# Patient Record
Sex: FEMALE | Race: WHITE | Employment: FULL TIME | URBAN - METROPOLITAN AREA
[De-identification: names, ages, dates, MRNs, and addresses within clinical notes are randomized per-mention and may not be internally consistent; named-entity substitution may affect disease eponyms.]

---

## 2024-03-25 ENCOUNTER — HOSPITAL ENCOUNTER (OUTPATIENT)
Dept: ULTRASOUND IMAGING | Age: 39
Discharge: HOME | End: 2024-03-27
Payer: COMMERCIAL

## 2024-03-25 DIAGNOSIS — I10 ESSENTIAL (PRIMARY) HYPERTENSION: ICD-10-CM

## 2024-03-25 PROCEDURE — 76705 ECHO EXAM OF ABDOMEN: CPT

## 2024-05-24 RX ORDER — SODIUM CHLORIDE, SODIUM LACTATE, POTASSIUM CHLORIDE, CALCIUM CHLORIDE 600; 310; 30; 20 MG/100ML; MG/100ML; MG/100ML; MG/100ML
INJECTION, SOLUTION INTRAVENOUS CONTINUOUS
OUTPATIENT
Start: 2024-05-24

## 2024-05-29 ENCOUNTER — HOSPITAL ENCOUNTER (OUTPATIENT)
Dept: PREADMISSION TESTING | Age: 39
Discharge: HOME | End: 2024-05-29
Payer: COMMERCIAL

## 2024-05-29 VITALS
HEART RATE: 74 BPM | DIASTOLIC BLOOD PRESSURE: 76 MMHG | HEIGHT: 68 IN | OXYGEN SATURATION: 100 % | WEIGHT: 268 LBS | TEMPERATURE: 98.1 F | SYSTOLIC BLOOD PRESSURE: 123 MMHG | RESPIRATION RATE: 16 BRPM | BODY MASS INDEX: 40.62 KG/M2

## 2024-05-29 DIAGNOSIS — Z01.818 PREOP TESTING: Primary | ICD-10-CM

## 2024-05-29 DIAGNOSIS — Z01.818 PRE-OP TESTING: ICD-10-CM

## 2024-05-29 LAB
ABO + RH BLD: NORMAL
ALBUMIN SERPL-MCNC: 4.1 G/DL (ref 3.5–5.2)
ALP SERPL-CCNC: 61 U/L (ref 35–104)
ALT SERPL-CCNC: 10 U/L (ref 0–32)
ANION GAP SERPL CALCULATED.3IONS-SCNC: 9 MMOL/L (ref 7–16)
ARM BAND NUMBER: NORMAL
AST SERPL-CCNC: 14 U/L (ref 0–31)
BASOPHILS # BLD: 0.08 K/UL (ref 0–0.2)
BASOPHILS NFR BLD: 1 % (ref 0–2)
BILIRUB SERPL-MCNC: 0.3 MG/DL (ref 0–1.2)
BLOOD BANK SAMPLE EXPIRATION: NORMAL
BLOOD GROUP ANTIBODIES SERPL: NEGATIVE
BUN SERPL-MCNC: 14 MG/DL (ref 6–20)
CALCIUM SERPL-MCNC: 8.7 MG/DL (ref 8.6–10.2)
CHLORIDE SERPL-SCNC: 105 MMOL/L (ref 98–107)
CO2 SERPL-SCNC: 25 MMOL/L (ref 22–29)
CREAT SERPL-MCNC: 0.8 MG/DL (ref 0.5–1)
EOSINOPHIL # BLD: 0.28 K/UL (ref 0.05–0.5)
EOSINOPHILS RELATIVE PERCENT: 3 % (ref 0–6)
ERYTHROCYTE [DISTWIDTH] IN BLOOD BY AUTOMATED COUNT: 13.6 % (ref 11.5–15)
GFR, ESTIMATED: >90 ML/MIN/1.73M2
GLUCOSE SERPL-MCNC: 86 MG/DL (ref 74–99)
HCT VFR BLD AUTO: 38.3 % (ref 34–48)
HGB BLD-MCNC: 12.3 G/DL (ref 11.5–15.5)
IMM GRANULOCYTES # BLD AUTO: 0.04 K/UL (ref 0–0.58)
IMM GRANULOCYTES NFR BLD: 0 % (ref 0–5)
LYMPHOCYTES NFR BLD: 2.57 K/UL (ref 1.5–4)
LYMPHOCYTES RELATIVE PERCENT: 27 % (ref 20–42)
MCH RBC QN AUTO: 27.4 PG (ref 26–35)
MCHC RBC AUTO-ENTMCNC: 32.1 G/DL (ref 32–34.5)
MCV RBC AUTO: 85.3 FL (ref 80–99.9)
MONOCYTES NFR BLD: 0.6 K/UL (ref 0.1–0.95)
MONOCYTES NFR BLD: 6 % (ref 2–12)
NEUTROPHILS NFR BLD: 62 % (ref 43–80)
NEUTS SEG NFR BLD: 5.84 K/UL (ref 1.8–7.3)
PLATELET # BLD AUTO: 242 K/UL (ref 130–450)
PMV BLD AUTO: 11.6 FL (ref 7–12)
POTASSIUM SERPL-SCNC: 4 MMOL/L (ref 3.5–5)
PROT SERPL-MCNC: 7.2 G/DL (ref 6.4–8.3)
RBC # BLD AUTO: 4.49 M/UL (ref 3.5–5.5)
SODIUM SERPL-SCNC: 139 MMOL/L (ref 132–146)
WBC OTHER # BLD: 9.4 K/UL (ref 4.5–11.5)

## 2024-05-29 PROCEDURE — 86850 RBC ANTIBODY SCREEN: CPT

## 2024-05-29 PROCEDURE — 86901 BLOOD TYPING SEROLOGIC RH(D): CPT

## 2024-05-29 PROCEDURE — 86900 BLOOD TYPING SEROLOGIC ABO: CPT

## 2024-05-29 PROCEDURE — 85025 COMPLETE CBC W/AUTO DIFF WBC: CPT

## 2024-05-29 PROCEDURE — 93005 ELECTROCARDIOGRAM TRACING: CPT | Performed by: ANESTHESIOLOGY

## 2024-05-29 PROCEDURE — 80053 COMPREHEN METABOLIC PANEL: CPT

## 2024-05-29 RX ORDER — LISINOPRIL 20 MG/1
20 TABLET ORAL DAILY
COMMUNITY
Start: 2024-03-20

## 2024-05-29 NOTE — PROGRESS NOTES
Bank bracelet to the hospital on the day of surgery.    IIf appropriate bring crutches, inspirex, WALKER, CANE etc...    Notify your Surgeon if you develop any illness between now and surgery time, cough, cold, fever, sore throat, nausea, vomiting, etc.  Please notify your surgeon if you experience dizziness, shortness of breath or blurred vision between now & the time of your surgery.    If you have ___dentures, they will be removed before going to the OR; we will provide you a container. If you wear ___contact lenses or ___glasses, they will be removed; please bring a case for them.    To provide excellent care visitors will be limited to 1 in the room at any given time.                            During flu season no children under the age of 14 are permitted in the hospital for the safety of all patients.      On day of surgery please come to the main lobby and stop at the information desk.      Other                  Please call AMBULATORY CARE if you have any further questions.   Pre Admit Testing           [phone]     [facility] [phone]

## 2024-05-30 LAB
EKG ATRIAL RATE: 64 BPM
EKG P AXIS: 18 DEGREES
EKG P-R INTERVAL: 192 MS
EKG Q-T INTERVAL: 376 MS
EKG QRS DURATION: 80 MS
EKG QTC CALCULATION (BAZETT): 387 MS
EKG R AXIS: 7 DEGREES
EKG T AXIS: 30 DEGREES
EKG VENTRICULAR RATE: 64 BPM

## 2024-06-03 ENCOUNTER — ANESTHESIA EVENT (OUTPATIENT)
Dept: OPERATING ROOM | Age: 39
End: 2024-06-03
Payer: COMMERCIAL

## 2024-06-03 ENCOUNTER — HOSPITAL ENCOUNTER (OUTPATIENT)
Age: 39
Setting detail: OUTPATIENT SURGERY
Discharge: HOME | End: 2024-06-03
Attending: OBSTETRICS & GYNECOLOGY | Admitting: OBSTETRICS & GYNECOLOGY
Payer: COMMERCIAL

## 2024-06-03 ENCOUNTER — ANESTHESIA (OUTPATIENT)
Dept: OPERATING ROOM | Age: 39
End: 2024-06-03
Payer: COMMERCIAL

## 2024-06-03 VITALS
HEIGHT: 68 IN | WEIGHT: 268 LBS | HEART RATE: 56 BPM | DIASTOLIC BLOOD PRESSURE: 68 MMHG | BODY MASS INDEX: 40.62 KG/M2 | RESPIRATION RATE: 14 BRPM | TEMPERATURE: 98.2 F | SYSTOLIC BLOOD PRESSURE: 119 MMHG | OXYGEN SATURATION: 94 %

## 2024-06-03 DIAGNOSIS — N92.1 MENORRHAGIA WITH IRREGULAR CYCLE: ICD-10-CM

## 2024-06-03 DIAGNOSIS — G89.18 POSTOPERATIVE PAIN: Primary | ICD-10-CM

## 2024-06-03 PROBLEM — Z34.93 NORMAL PREGNANCY, THIRD TRIMESTER: Status: RESOLVED | Noted: 2020-11-24 | Resolved: 2024-06-03

## 2024-06-03 LAB
HCG, URINE, POC: NEGATIVE
Lab: NORMAL
NEGATIVE QC PASS/FAIL: NORMAL
POSITIVE QC PASS/FAIL: NORMAL

## 2024-06-03 PROCEDURE — 2580000003 HC RX 258: Performed by: OBSTETRICS & GYNECOLOGY

## 2024-06-03 PROCEDURE — 6360000002 HC RX W HCPCS: Performed by: NURSE ANESTHETIST, CERTIFIED REGISTERED

## 2024-06-03 PROCEDURE — S2900 ROBOTIC SURGICAL SYSTEM: HCPCS | Performed by: OBSTETRICS & GYNECOLOGY

## 2024-06-03 PROCEDURE — 2500000003 HC RX 250 WO HCPCS: Performed by: NURSE ANESTHETIST, CERTIFIED REGISTERED

## 2024-06-03 PROCEDURE — 88307 TISSUE EXAM BY PATHOLOGIST: CPT

## 2024-06-03 PROCEDURE — 2580000003 HC RX 258: Performed by: NURSE ANESTHETIST, CERTIFIED REGISTERED

## 2024-06-03 PROCEDURE — 7100000001 HC PACU RECOVERY - ADDTL 15 MIN: Performed by: OBSTETRICS & GYNECOLOGY

## 2024-06-03 PROCEDURE — 3600000019 HC SURGERY ROBOT ADDTL 15MIN: Performed by: OBSTETRICS & GYNECOLOGY

## 2024-06-03 PROCEDURE — 2580000003 HC RX 258: Performed by: ANESTHESIOLOGY

## 2024-06-03 PROCEDURE — 7100000010 HC PHASE II RECOVERY - FIRST 15 MIN: Performed by: OBSTETRICS & GYNECOLOGY

## 2024-06-03 PROCEDURE — 2709999900 HC NON-CHARGEABLE SUPPLY: Performed by: OBSTETRICS & GYNECOLOGY

## 2024-06-03 PROCEDURE — 6360000002 HC RX W HCPCS: Performed by: OBSTETRICS & GYNECOLOGY

## 2024-06-03 PROCEDURE — 7100000000 HC PACU RECOVERY - FIRST 15 MIN: Performed by: OBSTETRICS & GYNECOLOGY

## 2024-06-03 PROCEDURE — 3600000009 HC SURGERY ROBOT BASE: Performed by: OBSTETRICS & GYNECOLOGY

## 2024-06-03 PROCEDURE — 6360000002 HC RX W HCPCS: Performed by: ANESTHESIOLOGY

## 2024-06-03 PROCEDURE — 7100000011 HC PHASE II RECOVERY - ADDTL 15 MIN: Performed by: OBSTETRICS & GYNECOLOGY

## 2024-06-03 PROCEDURE — 3700000000 HC ANESTHESIA ATTENDED CARE: Performed by: OBSTETRICS & GYNECOLOGY

## 2024-06-03 PROCEDURE — 3700000001 HC ADD 15 MINUTES (ANESTHESIA): Performed by: OBSTETRICS & GYNECOLOGY

## 2024-06-03 RX ORDER — ONDANSETRON 2 MG/ML
4 INJECTION INTRAMUSCULAR; INTRAVENOUS
Status: DISCONTINUED | OUTPATIENT
Start: 2024-06-03 | End: 2024-06-03 | Stop reason: HOSPADM

## 2024-06-03 RX ORDER — MIDAZOLAM HYDROCHLORIDE 1 MG/ML
INJECTION INTRAMUSCULAR; INTRAVENOUS PRN
Status: DISCONTINUED | OUTPATIENT
Start: 2024-06-03 | End: 2024-06-03 | Stop reason: SDUPTHER

## 2024-06-03 RX ORDER — DEXMEDETOMIDINE HYDROCHLORIDE 100 UG/ML
INJECTION, SOLUTION INTRAVENOUS PRN
Status: DISCONTINUED | OUTPATIENT
Start: 2024-06-03 | End: 2024-06-03 | Stop reason: SDUPTHER

## 2024-06-03 RX ORDER — KETOROLAC TROMETHAMINE 30 MG/ML
INJECTION, SOLUTION INTRAMUSCULAR; INTRAVENOUS PRN
Status: DISCONTINUED | OUTPATIENT
Start: 2024-06-03 | End: 2024-06-03 | Stop reason: SDUPTHER

## 2024-06-03 RX ORDER — SODIUM CHLORIDE 0.9 % (FLUSH) 0.9 %
5-40 SYRINGE (ML) INJECTION PRN
Status: DISCONTINUED | OUTPATIENT
Start: 2024-06-03 | End: 2024-06-03 | Stop reason: HOSPADM

## 2024-06-03 RX ORDER — DIPHENHYDRAMINE HYDROCHLORIDE 50 MG/ML
INJECTION INTRAMUSCULAR; INTRAVENOUS PRN
Status: DISCONTINUED | OUTPATIENT
Start: 2024-06-03 | End: 2024-06-03 | Stop reason: SDUPTHER

## 2024-06-03 RX ORDER — SODIUM CHLORIDE, SODIUM LACTATE, POTASSIUM CHLORIDE, CALCIUM CHLORIDE 600; 310; 30; 20 MG/100ML; MG/100ML; MG/100ML; MG/100ML
INJECTION, SOLUTION INTRAVENOUS CONTINUOUS
Status: DISCONTINUED | OUTPATIENT
Start: 2024-06-03 | End: 2024-06-03 | Stop reason: HOSPADM

## 2024-06-03 RX ORDER — SODIUM CHLORIDE 0.9 % (FLUSH) 0.9 %
5-40 SYRINGE (ML) INJECTION EVERY 12 HOURS SCHEDULED
Status: DISCONTINUED | OUTPATIENT
Start: 2024-06-03 | End: 2024-06-03 | Stop reason: HOSPADM

## 2024-06-03 RX ORDER — ONDANSETRON HYDROCHLORIDE 8 MG/1
8 TABLET, FILM COATED ORAL EVERY 8 HOURS PRN
Qty: 25 TABLET | Refills: 1 | Status: SHIPPED | OUTPATIENT
Start: 2024-06-03

## 2024-06-03 RX ORDER — FENTANYL CITRATE 50 UG/ML
INJECTION, SOLUTION INTRAMUSCULAR; INTRAVENOUS PRN
Status: DISCONTINUED | OUTPATIENT
Start: 2024-06-03 | End: 2024-06-03 | Stop reason: SDUPTHER

## 2024-06-03 RX ORDER — LORAZEPAM 2 MG/ML
0.5 INJECTION INTRAMUSCULAR
Status: DISCONTINUED | OUTPATIENT
Start: 2024-06-03 | End: 2024-06-03 | Stop reason: HOSPADM

## 2024-06-03 RX ORDER — SODIUM CHLORIDE, SODIUM LACTATE, POTASSIUM CHLORIDE, CALCIUM CHLORIDE 600; 310; 30; 20 MG/100ML; MG/100ML; MG/100ML; MG/100ML
INJECTION, SOLUTION INTRAVENOUS CONTINUOUS PRN
Status: DISCONTINUED | OUTPATIENT
Start: 2024-06-03 | End: 2024-06-03 | Stop reason: SDUPTHER

## 2024-06-03 RX ORDER — KETOROLAC TROMETHAMINE 10 MG/1
10 TABLET, FILM COATED ORAL EVERY 6 HOURS PRN
Qty: 20 TABLET | Refills: 0 | Status: SHIPPED | OUTPATIENT
Start: 2024-06-03 | End: 2025-06-03

## 2024-06-03 RX ORDER — DEXAMETHASONE SODIUM PHOSPHATE 10 MG/ML
INJECTION, SOLUTION INTRAMUSCULAR; INTRAVENOUS PRN
Status: DISCONTINUED | OUTPATIENT
Start: 2024-06-03 | End: 2024-06-03 | Stop reason: SDUPTHER

## 2024-06-03 RX ORDER — ROCURONIUM BROMIDE 10 MG/ML
INJECTION, SOLUTION INTRAVENOUS PRN
Status: DISCONTINUED | OUTPATIENT
Start: 2024-06-03 | End: 2024-06-03 | Stop reason: SDUPTHER

## 2024-06-03 RX ORDER — SODIUM CHLORIDE 9 MG/ML
INJECTION, SOLUTION INTRAVENOUS PRN
Status: DISCONTINUED | OUTPATIENT
Start: 2024-06-03 | End: 2024-06-03 | Stop reason: HOSPADM

## 2024-06-03 RX ORDER — KETOROLAC TROMETHAMINE 30 MG/ML
30 INJECTION, SOLUTION INTRAMUSCULAR; INTRAVENOUS EVERY 6 HOURS PRN
Status: DISCONTINUED | OUTPATIENT
Start: 2024-06-03 | End: 2024-06-03 | Stop reason: HOSPADM

## 2024-06-03 RX ORDER — LIDOCAINE HYDROCHLORIDE 20 MG/ML
INJECTION, SOLUTION INTRAVENOUS PRN
Status: DISCONTINUED | OUTPATIENT
Start: 2024-06-03 | End: 2024-06-03 | Stop reason: SDUPTHER

## 2024-06-03 RX ORDER — PROPOFOL 10 MG/ML
INJECTION, EMULSION INTRAVENOUS PRN
Status: DISCONTINUED | OUTPATIENT
Start: 2024-06-03 | End: 2024-06-03 | Stop reason: SDUPTHER

## 2024-06-03 RX ORDER — METHOCARBAMOL 100 MG/ML
1000 INJECTION, SOLUTION INTRAMUSCULAR; INTRAVENOUS ONCE
Status: COMPLETED | OUTPATIENT
Start: 2024-06-03 | End: 2024-06-03

## 2024-06-03 RX ORDER — FENTANYL CITRATE 0.05 MG/ML
50 INJECTION, SOLUTION INTRAMUSCULAR; INTRAVENOUS EVERY 5 MIN PRN
Status: DISCONTINUED | OUTPATIENT
Start: 2024-06-03 | End: 2024-06-03 | Stop reason: HOSPADM

## 2024-06-03 RX ORDER — MEPERIDINE HYDROCHLORIDE 25 MG/ML
12.5 INJECTION INTRAMUSCULAR; INTRAVENOUS; SUBCUTANEOUS EVERY 5 MIN PRN
Status: DISCONTINUED | OUTPATIENT
Start: 2024-06-03 | End: 2024-06-03 | Stop reason: HOSPADM

## 2024-06-03 RX ORDER — ONDANSETRON 2 MG/ML
INJECTION INTRAMUSCULAR; INTRAVENOUS PRN
Status: DISCONTINUED | OUTPATIENT
Start: 2024-06-03 | End: 2024-06-03 | Stop reason: SDUPTHER

## 2024-06-03 RX ORDER — NALOXONE HYDROCHLORIDE 0.4 MG/ML
INJECTION, SOLUTION INTRAMUSCULAR; INTRAVENOUS; SUBCUTANEOUS PRN
Status: DISCONTINUED | OUTPATIENT
Start: 2024-06-03 | End: 2024-06-03 | Stop reason: HOSPADM

## 2024-06-03 RX ORDER — OXYCODONE HYDROCHLORIDE 5 MG/1
5 TABLET ORAL EVERY 6 HOURS PRN
Qty: 15 TABLET | Refills: 0 | Status: SHIPPED | OUTPATIENT
Start: 2024-06-03 | End: 2024-06-10

## 2024-06-03 RX ORDER — PROCHLORPERAZINE EDISYLATE 5 MG/ML
5 INJECTION INTRAMUSCULAR; INTRAVENOUS
Status: COMPLETED | OUTPATIENT
Start: 2024-06-03 | End: 2024-06-03

## 2024-06-03 RX ADMIN — FENTANYL CITRATE 50 MCG: 50 INJECTION, SOLUTION INTRAMUSCULAR; INTRAVENOUS at 09:05

## 2024-06-03 RX ADMIN — PROCHLORPERAZINE EDISYLATE 5 MG: 5 INJECTION INTRAMUSCULAR; INTRAVENOUS at 09:58

## 2024-06-03 RX ADMIN — SUGAMMADEX 250 MG: 100 INJECTION, SOLUTION INTRAVENOUS at 09:15

## 2024-06-03 RX ADMIN — MIDAZOLAM 2 MG: 1 INJECTION INTRAMUSCULAR; INTRAVENOUS at 07:43

## 2024-06-03 RX ADMIN — KETOROLAC TROMETHAMINE 30 MG: 30 INJECTION, SOLUTION INTRAMUSCULAR; INTRAVENOUS at 09:03

## 2024-06-03 RX ADMIN — LIDOCAINE HYDROCHLORIDE 100 MG: 20 INJECTION, SOLUTION INTRAVENOUS at 07:49

## 2024-06-03 RX ADMIN — PROPOFOL 200 MG: 10 INJECTION, EMULSION INTRAVENOUS at 07:49

## 2024-06-03 RX ADMIN — FENTANYL CITRATE 150 MCG: 50 INJECTION, SOLUTION INTRAMUSCULAR; INTRAVENOUS at 07:49

## 2024-06-03 RX ADMIN — SODIUM CHLORIDE, POTASSIUM CHLORIDE, SODIUM LACTATE AND CALCIUM CHLORIDE: 600; 310; 30; 20 INJECTION, SOLUTION INTRAVENOUS at 08:40

## 2024-06-03 RX ADMIN — SODIUM CHLORIDE, POTASSIUM CHLORIDE, SODIUM LACTATE AND CALCIUM CHLORIDE: 600; 310; 30; 20 INJECTION, SOLUTION INTRAVENOUS at 07:38

## 2024-06-03 RX ADMIN — ONDANSETRON 4 MG: 2 INJECTION INTRAMUSCULAR; INTRAVENOUS at 09:03

## 2024-06-03 RX ADMIN — DIPHENHYDRAMINE HYDROCHLORIDE 12.5 MG: 50 INJECTION INTRAMUSCULAR; INTRAVENOUS at 07:49

## 2024-06-03 RX ADMIN — SODIUM CHLORIDE, POTASSIUM CHLORIDE, SODIUM LACTATE AND CALCIUM CHLORIDE: 600; 310; 30; 20 INJECTION, SOLUTION INTRAVENOUS at 06:18

## 2024-06-03 RX ADMIN — FENTANYL CITRATE 50 MCG: 50 INJECTION, SOLUTION INTRAMUSCULAR; INTRAVENOUS at 08:46

## 2024-06-03 RX ADMIN — DEXMEDETOMIDINE HYDROCHLORIDE 16 MCG: 100 INJECTION, SOLUTION INTRAVENOUS at 09:10

## 2024-06-03 RX ADMIN — CEFOXITIN 2000 MG: 2 INJECTION, POWDER, FOR SOLUTION INTRAVENOUS at 07:54

## 2024-06-03 RX ADMIN — ROCURONIUM BROMIDE 50 MG: 10 SOLUTION INTRAVENOUS at 07:49

## 2024-06-03 RX ADMIN — METHOCARBAMOL 1000 MG: 100 INJECTION INTRAMUSCULAR; INTRAVENOUS at 10:00

## 2024-06-03 RX ADMIN — DEXAMETHASONE SODIUM PHOSPHATE 10 MG: 10 INJECTION, SOLUTION INTRAMUSCULAR; INTRAVENOUS at 07:49

## 2024-06-03 ASSESSMENT — PAIN - FUNCTIONAL ASSESSMENT
PAIN_FUNCTIONAL_ASSESSMENT: ACTIVITIES ARE NOT PREVENTED
PAIN_FUNCTIONAL_ASSESSMENT: ACTIVITIES ARE NOT PREVENTED
PAIN_FUNCTIONAL_ASSESSMENT: 0-10

## 2024-06-03 ASSESSMENT — PAIN SCALES - GENERAL
PAINLEVEL_OUTOF10: 1
PAINLEVEL_OUTOF10: 3
PAINLEVEL_OUTOF10: 0
PAINLEVEL_OUTOF10: 2

## 2024-06-03 ASSESSMENT — PAIN DESCRIPTION - DESCRIPTORS
DESCRIPTORS: ACHING;DISCOMFORT;SORE
DESCRIPTORS: ACHING;SORE;DISCOMFORT

## 2024-06-03 ASSESSMENT — PAIN DESCRIPTION - LOCATION
LOCATION: ABDOMEN
LOCATION: ABDOMEN

## 2024-06-03 ASSESSMENT — PAIN SCALES - WONG BAKER: WONGBAKER_NUMERICALRESPONSE: NO HURT

## 2024-06-03 ASSESSMENT — PAIN DESCRIPTION - ORIENTATION
ORIENTATION: MID;LOWER
ORIENTATION: MID

## 2024-06-03 NOTE — PROGRESS NOTES
CLINICAL PHARMACY NOTE: MEDS TO BEDS    Total # of Prescriptions Filled: 3   The following medications were delivered to the patient:  Oxycodone 5 mg  Ketorolac 10 mg  Ondansetron 8 mg    Additional Documentation:

## 2024-06-03 NOTE — ANESTHESIA PRE PROCEDURE
Department of Anesthesiology  Preprocedure Note       Name:  Dasia Prasad   Age:  38 y.o.  :  1985                                          MRN:  [id number]         Date:  6/3/2024      Surgeon: Surgeon(s):  Horacio Lindsey MD    Procedure: Procedure(s):  ROBOTIC ASSISTED TOTAL LAPAROSCOPIC HYSTERECTOMY    Medications prior to admission:   Prior to Admission medications    Medication Sig Start Date End Date Taking? Authorizing Provider   lisinopril (PRINIVIL;ZESTRIL) 20 MG tablet Take 1 tablet by mouth daily 3/20/24   Provider, MD Clemente       Current medications:    Current Facility-Administered Medications   Medication Dose Route Frequency Provider Last Rate Last Admin    lactated ringers IV soln infusion   IntraVENous Continuous Horacio Lindsey MD        sodium chloride flush 0.9 % injection 5-40 mL  5-40 mL IntraVENous 2 times per day Hoarcio Lindsey MD        sodium chloride flush 0.9 % injection 5-40 mL  5-40 mL IntraVENous PRN Horacio Lindsey MD        0.9 % sodium chloride infusion   IntraVENous PRN Horacio Lindsey MD        cefOXitin (MEFOXIN) 2,000 mg in sterile water 20 mL IV syringe  2,000 mg IntraVENous On Call to OR Horacio Lindsey MD        lactated ringers IV soln infusion   IntraVENous Continuous Neidig, Jerald Forbes MD 50 mL/hr at 24 0618 New Bag at 24 0618       Allergies:  No Known Allergies    Problem List:    Patient Active Problem List   Diagnosis Code    Obesity E66.9    Migraine headache G43.909    Fatigue R53.83    Normal pregnancy, third trimester Z34.93       Past Medical History:        Diagnosis Date    Hypertension     Migraine        Past Surgical History:        Procedure Laterality Date    APPENDECTOMY       SECTION       SECTION N/A 2020    REPEAT  SECTION performed by Osvaldo Reed MD at [facility] L&D OR    TUBAL LIGATION         Social History:    Social History     Tobacco Use    Smoking

## 2024-06-03 NOTE — DISCHARGE INSTRUCTIONS
Dr. Horacio Diop M.D.  [address], [place] [address]  [phone]    Robotic Hysterectomy Discharge Instructions    Activity  Rest when you feel tired. Getting enough sleep will help you recover.  Try to walk each day. Start by walking a little more than you did the day before. Bit by bit, increase the amount you walk. Walking boost blood flow and helps prevent pneumonia and constipation.  Avoid lifting anything that would make you strain. This may include heavy grocery bags and milk containers, a heavy briefcase or backpack, bags of cat litter or dog food, a vacuum , or a child.  Avoid strenuous activities, such as biking, jogging, weight lifting, or aerobic exercise, until your six (6) week check up.  You can drive when you are not taking narcotics for pain.  You may shower anytime after surgery. Pat the incision dry. Do not take a bath for the first two (2) weeks.   No sex for 6 weeks.    Diet  You can eat your normal diet. If your stomach is upset, try bland, low-fat foods like plain rice, broiled chicken, toast, and yogurt.  If your bowel movements are not regular right after surgery, try to avoid constipation and straining. Drink plenty of water. Your doctor may suggest fiber, a stool softener, or a mild laxative.    Medicines  Your doctor will tell you if and when you can restart your medicines. You will also get instructions about taking any new medicines.  If you take aspirin or some other blood thinner, ask your doctor if and when to start taking it again. Make sure that you understand exactly what your doctor wants you to do.  Be safe with your medicines. Read and follow all instructions on the label.             If you are given a prescription medicine for pain, take it as prescribed.             If you are not taking a prescription pain medicine, ask your doctor if you can take   an over-the -counter medicine.             If you are prescribed antibiotics, take them as directed. Do  not stop taking them just because your feel better. You need to take the full course of antibiotics.    Incision Care  You have stitches under the skin and are covered with surgical glue.  Wash the area daily with warm, soapy water, and pat it dry. Don't use hydrogen peroxide or alcohol. They can slow healing.  You may cover the area with a gauze bandage if it oozes fluid or rubs against clothing.    Other instructions  You may have some light vaginal bleeding. Wear sanitary pads if needed. Do not douche or use tampons.    Follow-up care is a key part of your treatment and safety. Be sure to make and go to all appointments, and call your doctor if you are having problems. It's also good idea to know your test results and keep a list of the medicines you take.    When should you call for help?  Call 911 anytime you think you may need emergency care. For example, call if:  You passed out (lost consciousness).  You have chest pain,are short of breath, cough up blood.  Call your doctor now or seek immediate medical care if:  You have pain that does not get better after you take pain medicine.  You cannot pass stools or gas.  You have vaginal discharge that has increased in amount or smells bad.  You are sick to your stomach or cannot drink fluids.  You have loose stitches, or your incision comes open.  Bright red blood has soaked through the bandage over your incision.  You have signs of infection such as :             Increased pain,swelling,warmth, or redness.             Red streaks leading from the incision.             Pus draining from the incision.             A fever.  You have severe vaginal bleeding. This means that you are soaking through your usual pads every hour for two (2) or more hours.  You have signs of a blood clot in your leg (called a deep vein thrombosis), such as:  Pain in your calf, back of the knee ,thigh, or groin.  Redness and swelling in your leg or groin.  Watch closely for changes in your

## 2024-06-03 NOTE — OP NOTE
Operative Note      Patient: Dasia Prasad  YOB: 1985  MRN: [id number]    Date of Procedure: 6/3/2024    Pre-Op Diagnosis Codes:     * Menorrhagia with irregular cycle [N92.1]    Post-Op Diagnosis: Same       Procedure(s):  ROBOTIC ASSISTED TOTAL LAPAROSCOPIC HYSTERECTOMY    Surgeon(s):  Horacio Lindsey MD    Assistant:   Surgical Assistant: Nakul Andre RN    Anesthesia: General    Estimated Blood Loss (mL): Minimal    Complications: None    Specimens:   ID Type Source Tests Collected by Time Destination   A : Uterus and cervix Tissue Tissue SURGICAL PATHOLOGY Horacio Lindsey MD 6/3/2024 0854        Implants:  * No implants in log *      Drains:   Urinary Catheter 06/03/24 Thomas (Active)   $ Urethral catheter insertion Inserted for procedure 06/03/24 0958   Catheter Indications Perioperative use for selected surgical procedures 06/03/24 0958   Site Assessment Pink;Moist 06/03/24 0958   Urine Color Yellow 06/03/24 0958   Urine Appearance Clear 06/03/24 0958   Collection Container Standard 06/03/24 0928   Catheter Best Practices  Drainage tube clipped to bed;Bag below bladder;Bag not on floor;Lack of dependent loop in tubing;Drainage bag less than half full 06/03/24 0958   Status Draining 06/03/24 0958   Output (mL) 250 mL 06/03/24 0928       Findings:  Infection Present At Time Of Surgery (PATOS) (choose all levels that have infection present):  No infection present  Other Findings: Uterus with superficial endometriosis    Detailed Description of Procedure:     Patient was brought to the operating room placed in dorsal supine position.  General anesthesia with endotracheal ovation was then performed.  Patient was then placed in the low lithotomy position with both arms tucked.  She was prepped and draped in usual sterile fashion.  Thomas drain was then placed.  Vaginal retractors were introduced cervix held single-tooth tenaculum and sounded to 10 cm.  A Paulina uterine manipulator was then  placed with 10 cm tip and a 3.5 cm cup.    Attention was then brought to the umbilicus at the base of the umbilicus skin incision made with a scalpel and a Veress needle was introduced.  Intra-abdominal flexion was proceeded with when deemed adequate a robotic trocar was then placed.  2 accessory trocars were then placed.  There were placed laterally at the level of the umbilicus.  The da Gregorio X Xi patient cart was then prepared and docked from the patient's left side and targeting was then performed.  The arms were then docked and the #1 arm was not utilized to was utilized with a force bipolar the #3 had the camera and the forehead the hot kena.    The right round ligament then identified coapt and divided with the force bipolar then opened up with the hot kena and opened up to the pelvic sidewall.  In the posterior leaf of the broad ligament avascular space the window was then created and then the right utero-ovarian ligament was then coapted and divided.  Bladder flap was then created and carried around to the other side the left front leg was then coapted and divided with the force bipolar than the hot sure the posterior of the broad leg was then opened in the left utero-ovarian ligament was then coapted and divided.  Bladder was then mobilized off the cervix and vagina using the hot kena and was well mobilized.  Uterine artery was then skeletonized on the right side and then coapt and divided with the force bipolar than the hot sure an identical procedure performed on the opposite side.  Cervix was then removed from the vagina by cutting along the KOH cup and the specimen was then retrieved.    Vaginal cuff was then closed with running suture of 0 V-Loc excellent hemostasis was noted the needle was then removed and a small amount of blood was then removed with the suction tip.  Patient was then had the instruments were then removed.  The da Gregorio X Xi cart was then undocked patient was then placed in

## 2024-06-03 NOTE — ANESTHESIA POSTPROCEDURE EVALUATION
Department of Anesthesiology  Postprocedure Note    Patient: Dasia Prasad  MRN: [id number]  YOB: 1985  Date of evaluation: 6/3/2024    Procedure Summary       Date: 06/03/24 Room / Location: [address]    Anesthesia Start: 0738 Anesthesia Stop: 0927    Procedure: ROBOTIC ASSISTED TOTAL LAPAROSCOPIC HYSTERECTOMY (Abdomen) Diagnosis:       Menorrhagia with irregular cycle      (Menorrhagia with irregular cycle [N92.1])    Surgeons: Horacio Lindsey MD Responsible Provider: Jerald Brar MD    Anesthesia Type: general ASA Status: 2            Anesthesia Type: No value filed.    Wen Phase I: Wen Score: 8    Wen Phase II:      Anesthesia Post Evaluation    Patient location during evaluation: PACU  Patient participation: complete - patient participated  Level of consciousness: awake and alert  Pain score: 3  Airway patency: patent  Nausea & Vomiting: no nausea  Cardiovascular status: blood pressure returned to baseline  Respiratory status: acceptable  Hydration status: euvolemic  Pain management: adequate    No notable events documented.

## 2024-06-12 LAB — SURGICAL PATHOLOGY REPORT: NORMAL

## 2024-10-31 ENCOUNTER — TELEPHONE (OUTPATIENT)
Dept: SLEEP CENTER | Age: 39
End: 2024-10-31

## 2024-10-31 DIAGNOSIS — M25.561 RIGHT KNEE PAIN, UNSPECIFIED CHRONICITY: Primary | ICD-10-CM

## 2024-11-05 ENCOUNTER — OFFICE VISIT (OUTPATIENT)
Dept: ORTHOPEDIC SURGERY | Age: 39
End: 2024-11-05
Payer: COMMERCIAL

## 2024-11-05 VITALS — BODY MASS INDEX: 39.4 KG/M2 | HEIGHT: 68 IN | WEIGHT: 260 LBS

## 2024-11-05 DIAGNOSIS — M17.11 PRIMARY OSTEOARTHRITIS OF RIGHT KNEE: Primary | ICD-10-CM

## 2024-11-05 PROCEDURE — G8419 CALC BMI OUT NRM PARAM NOF/U: HCPCS | Performed by: ORTHOPAEDIC SURGERY

## 2024-11-05 PROCEDURE — G8484 FLU IMMUNIZE NO ADMIN: HCPCS | Performed by: ORTHOPAEDIC SURGERY

## 2024-11-05 PROCEDURE — 99214 OFFICE O/P EST MOD 30 MIN: CPT | Performed by: ORTHOPAEDIC SURGERY

## 2024-11-05 PROCEDURE — G8427 DOCREV CUR MEDS BY ELIG CLIN: HCPCS | Performed by: ORTHOPAEDIC SURGERY

## 2024-11-05 PROCEDURE — 4004F PT TOBACCO SCREEN RCVD TLK: CPT | Performed by: ORTHOPAEDIC SURGERY

## 2024-11-05 NOTE — PROGRESS NOTES
Anterior Drawer negative, Posterior Drawer negative  Hipolito's negative, Thallasy  negative,   PF grind test negative, Apprehension test negative,  Patellar J sign  negative    Xrays:   Mild medial compartment djd right knee    MRI:   1.  Diffuse thickening of the ACL with mucinous degeneration, similar   thickening and signal within the proximal PCL and fibular collateral   ligaments.  Findings are suspicious for sequela of previous ligament   sprains.     2. No meniscal tear or residual bone contusion.     3.  Low-grade chondral degenerative changes with small joint effusion.   Radiographic findings reviewed with patient    Impression:  Encounter Diagnosis   Name Primary?    Primary osteoarthritis of right knee Yes       Plan:   Natural history and expected course discussed. Questions answered.  Educational materials distributed.  Rest, ice, compression, and elevation (RICE) therapy.  Reduction in offending activity.  Patellar compression sleeve.  OTC analgesics as needed.    I had a lengthy discussion with the patient regarding their diagnosis. I explained treatment options including surgical vs non surgical treatment. I reviewed in detail the risks and benefits and outlined the procedure in detail with expected outcomes and possible complications.  I also discussed non surgical treatment such as injections (CSI and visco supplementation), physical therapy, topical creams and NSAID's. They have elected for surgical management at this time.     The risks and benefits of a knee arthroscopy were discussed with the patient.  The risks are including but not limited to: infection, injuries to blood vessels and nerves, non relief of symptoms, arthrofibrosis of knee, need for further operative intervention, blood loss, PE/DVT, MI and death.  The risks are understood by the patient and they wish to proceed with a Right knee arthroscopy, chrondroplasty and debridement 12/20/24.

## 2024-11-07 DIAGNOSIS — G47.33 OBSTRUCTIVE SLEEP APNEA: Primary | ICD-10-CM

## 2024-11-21 ENCOUNTER — TELEPHONE (OUTPATIENT)
Dept: ORTHOPEDIC SURGERY | Age: 39
End: 2024-11-21

## 2024-11-21 ENCOUNTER — PREP FOR PROCEDURE (OUTPATIENT)
Dept: ORTHOPEDIC SURGERY | Age: 39
End: 2024-11-21

## 2024-11-21 PROBLEM — M17.11 RIGHT KNEE DJD: Status: ACTIVE | Noted: 2024-11-21

## 2024-11-21 NOTE — TELEPHONE ENCOUNTER
Prior Authorization Form:      DEMOGRAPHICS:                     Patient Name:  Annmarie Prasad  Patient :  1985            Insurance:  Payor: [facility] / Plan: [facility] MEDICAID / Product Type: *No Product type* /   Insurance ID Number:    Payer/Plan Subscr  Sex Relation Sub. Ins. ID Effective Group Num   1. YADIRA - * ANNMARIE PRASAD 1985 Female Self [id number]                                   [address]   2. YADIRA - * ANNMARIE PRASAD 1985 Female Self [id number]                                   [address]         DIAGNOSIS & PROCEDURE:                       Procedure/Operation: RIGHT KNEE ARTHROSCOPY WITH CHONDROPLASTY AND DEBRIDEMENT           CPT Code: 91564    Diagnosis:  RIGHT KNEE DJD    ICD10 Code: M17.11    Location:  [facility]    Surgeon:  YAMILA ALFONSO    SCHEDULING INFORMATION:                          Date: 24    Time: TO FOLLOW              Anesthesia:  General                                                       Status:  Outpatient        Special Comments:  NONE       Electronically signed by Lana Hernandez ATC on 2024 at 10:50 AM

## 2024-12-16 RX ORDER — ISOTRETINOIN 30 MG/1
30 CAPSULE ORAL 2 TIMES DAILY
COMMUNITY

## 2024-12-16 RX ORDER — LISINOPRIL AND HYDROCHLOROTHIAZIDE 12.5; 2 MG/1; MG/1
1 TABLET ORAL 2 TIMES DAILY
COMMUNITY
Start: 2024-09-28

## 2024-12-17 ENCOUNTER — HOSPITAL ENCOUNTER (OUTPATIENT)
Age: 39
Discharge: HOME | End: 2024-12-17
Payer: COMMERCIAL

## 2024-12-17 ENCOUNTER — ANESTHESIA EVENT (OUTPATIENT)
Dept: OPERATING ROOM | Age: 39
End: 2024-12-17
Payer: COMMERCIAL

## 2024-12-17 ENCOUNTER — TELEPHONE (OUTPATIENT)
Dept: SURGERY | Age: 39
End: 2024-12-17

## 2024-12-17 DIAGNOSIS — M17.11 OSTEOARTHRITIS OF RIGHT KNEE, UNSPECIFIED OSTEOARTHRITIS TYPE: ICD-10-CM

## 2024-12-17 LAB
ANION GAP SERPL CALCULATED.3IONS-SCNC: 11 MMOL/L (ref 7–16)
BUN SERPL-MCNC: 14 MG/DL (ref 6–20)
CALCIUM SERPL-MCNC: 8.7 MG/DL (ref 8.6–10.2)
CHLORIDE SERPL-SCNC: 101 MMOL/L (ref 98–107)
CO2 SERPL-SCNC: 24 MMOL/L (ref 22–29)
CREAT SERPL-MCNC: 0.8 MG/DL (ref 0.5–1)
EKG ATRIAL RATE: 84 BPM
EKG P AXIS: 24 DEGREES
EKG P-R INTERVAL: 168 MS
EKG Q-T INTERVAL: 352 MS
EKG QRS DURATION: 80 MS
EKG QTC CALCULATION (BAZETT): 415 MS
EKG R AXIS: 12 DEGREES
EKG T AXIS: 36 DEGREES
EKG VENTRICULAR RATE: 84 BPM
GFR, ESTIMATED: >90 ML/MIN/1.73M2
GLUCOSE SERPL-MCNC: 124 MG/DL (ref 74–99)
POTASSIUM SERPL-SCNC: 4.2 MMOL/L (ref 3.5–5)
SODIUM SERPL-SCNC: 136 MMOL/L (ref 132–146)

## 2024-12-17 PROCEDURE — 36415 COLL VENOUS BLD VENIPUNCTURE: CPT

## 2024-12-17 PROCEDURE — 93005 ELECTROCARDIOGRAM TRACING: CPT | Performed by: ANESTHESIOLOGY

## 2024-12-17 PROCEDURE — 80048 BASIC METABOLIC PNL TOTAL CA: CPT

## 2024-12-17 NOTE — TELEPHONE ENCOUNTER
Request for records sent to referring office/GI. Office staff states that patient had no procedures (EGD/Colonoscopy) done.   Electronically signed by Joann Porras RN on 12/17/2024 at 8:30 AM

## 2024-12-17 NOTE — ANESTHESIA PRE PROCEDURE
Department of Anesthesiology  Preprocedure Note       Name:  Dasia Prasad   Age:  39 y.o.  :  1985                                          MRN:  [id number]         Date:  2024      Surgeon: Surgeon(s):  Andrei Renteria DO    Procedure: Procedure(s):  RIGHT KNEE ARTHROSCOPY WITH CHONDROPLASTY AND DEBRIDEMENT-24    Medications prior to admission:   Prior to Admission medications    Medication Sig Start Date End Date Taking? Authorizing Provider   lisinopril-hydroCHLOROthiazide (PRINZIDE;ZESTORETIC) 20-12.5 MG per tablet Take 1 tablet by mouth 2 times daily 24  Yes Provider, MD Clemente   ISOtretinoin (ACCUTANE) 30 MG chemo capsule Take 1 capsule by mouth 2 times daily   Yes Provider, MD Clemente       Current medications:    Current Facility-Administered Medications   Medication Dose Route Frequency Provider Last Rate Last Admin    ceFAZolin (ANCEF) 2,000 mg in sterile water 20 mL IV syringe  2,000 mg IntraVENous Once Marianne Velazquez PA-C        lactated ringers infusion   IntraVENous Continuous TecVicki echavarria  mL/hr at 24 1048 New Bag at 24 1048       Allergies:  No Known Allergies    Problem List:    Patient Active Problem List   Diagnosis Code    Obesity E66.9    Migraine headache G43.909    Fatigue R53.83    Right knee DJD M17.11       Past Medical History:        Diagnosis Date    Hypertension     Migraine        Past Surgical History:        Procedure Laterality Date    APPENDECTOMY       SECTION       SECTION N/A 2020    REPEAT  SECTION performed by Osvaldo Reed MD at [facility] L&D OR    HYSTERECTOMY (CERVIX STATUS UNKNOWN) N/A 6/3/2024    ROBOTIC ASSISTED TOTAL LAPAROSCOPIC HYSTERECTOMY performed by Horacio Lindsey MD at [facility] OR    TUBAL LIGATION         Social History:    Social History     Tobacco Use    Smoking status: Every Day     Current packs/day: 0.75     Average packs/day: 0.8 packs/day for 21.0 years

## 2024-12-20 ENCOUNTER — HOSPITAL ENCOUNTER (OUTPATIENT)
Age: 39
Setting detail: OUTPATIENT SURGERY
Discharge: HOME | End: 2024-12-20
Attending: ORTHOPAEDIC SURGERY | Admitting: ORTHOPAEDIC SURGERY
Payer: COMMERCIAL

## 2024-12-20 ENCOUNTER — ANESTHESIA (OUTPATIENT)
Dept: OPERATING ROOM | Age: 39
End: 2024-12-20
Payer: COMMERCIAL

## 2024-12-20 VITALS
OXYGEN SATURATION: 95 % | HEIGHT: 68 IN | DIASTOLIC BLOOD PRESSURE: 87 MMHG | BODY MASS INDEX: 41.68 KG/M2 | RESPIRATION RATE: 16 BRPM | TEMPERATURE: 98 F | HEART RATE: 99 BPM | SYSTOLIC BLOOD PRESSURE: 140 MMHG | WEIGHT: 275 LBS

## 2024-12-20 DIAGNOSIS — M17.11 PRIMARY OSTEOARTHRITIS OF RIGHT KNEE: ICD-10-CM

## 2024-12-20 DIAGNOSIS — M17.11 OSTEOARTHRITIS OF RIGHT KNEE, UNSPECIFIED OSTEOARTHRITIS TYPE: Primary | ICD-10-CM

## 2024-12-20 PROCEDURE — 2709999900 HC NON-CHARGEABLE SUPPLY: Performed by: ORTHOPAEDIC SURGERY

## 2024-12-20 PROCEDURE — 7100000001 HC PACU RECOVERY - ADDTL 15 MIN: Performed by: ORTHOPAEDIC SURGERY

## 2024-12-20 PROCEDURE — 2500000003 HC RX 250 WO HCPCS

## 2024-12-20 PROCEDURE — 6360000002 HC RX W HCPCS: Performed by: NURSE ANESTHETIST, CERTIFIED REGISTERED

## 2024-12-20 PROCEDURE — 3700000001 HC ADD 15 MINUTES (ANESTHESIA): Performed by: ORTHOPAEDIC SURGERY

## 2024-12-20 PROCEDURE — 2720000010 HC SURG SUPPLY STERILE: Performed by: ORTHOPAEDIC SURGERY

## 2024-12-20 PROCEDURE — 3600000003 HC SURGERY LEVEL 3 BASE: Performed by: ORTHOPAEDIC SURGERY

## 2024-12-20 PROCEDURE — 3600000013 HC SURGERY LEVEL 3 ADDTL 15MIN: Performed by: ORTHOPAEDIC SURGERY

## 2024-12-20 PROCEDURE — 6360000002 HC RX W HCPCS: Performed by: ORTHOPAEDIC SURGERY

## 2024-12-20 PROCEDURE — 29877 ARTHRS KNEE SURG DBRDMT/SHVG: CPT | Performed by: ORTHOPAEDIC SURGERY

## 2024-12-20 PROCEDURE — 6360000002 HC RX W HCPCS

## 2024-12-20 PROCEDURE — 3700000000 HC ANESTHESIA ATTENDED CARE: Performed by: ORTHOPAEDIC SURGERY

## 2024-12-20 PROCEDURE — 2580000003 HC RX 258: Performed by: ANESTHESIOLOGY

## 2024-12-20 PROCEDURE — 7100000000 HC PACU RECOVERY - FIRST 15 MIN: Performed by: ORTHOPAEDIC SURGERY

## 2024-12-20 PROCEDURE — 7100000011 HC PHASE II RECOVERY - ADDTL 15 MIN: Performed by: ORTHOPAEDIC SURGERY

## 2024-12-20 PROCEDURE — 7100000010 HC PHASE II RECOVERY - FIRST 15 MIN: Performed by: ORTHOPAEDIC SURGERY

## 2024-12-20 RX ORDER — SODIUM CHLORIDE 9 MG/ML
INJECTION, SOLUTION INTRAVENOUS PRN
Status: DISCONTINUED | OUTPATIENT
Start: 2024-12-20 | End: 2024-12-20 | Stop reason: HOSPADM

## 2024-12-20 RX ORDER — MEPERIDINE HYDROCHLORIDE 25 MG/ML
12.5 INJECTION INTRAMUSCULAR; INTRAVENOUS; SUBCUTANEOUS EVERY 5 MIN PRN
Status: DISCONTINUED | OUTPATIENT
Start: 2024-12-20 | End: 2024-12-20 | Stop reason: HOSPADM

## 2024-12-20 RX ORDER — SODIUM CHLORIDE, SODIUM LACTATE, POTASSIUM CHLORIDE, CALCIUM CHLORIDE 600; 310; 30; 20 MG/100ML; MG/100ML; MG/100ML; MG/100ML
INJECTION, SOLUTION INTRAVENOUS CONTINUOUS
Status: DISCONTINUED | OUTPATIENT
Start: 2024-12-20 | End: 2024-12-20 | Stop reason: HOSPADM

## 2024-12-20 RX ORDER — MIDAZOLAM HYDROCHLORIDE 1 MG/ML
INJECTION, SOLUTION INTRAMUSCULAR; INTRAVENOUS
Status: DISCONTINUED | OUTPATIENT
Start: 2024-12-20 | End: 2024-12-20 | Stop reason: SDUPTHER

## 2024-12-20 RX ORDER — DEXAMETHASONE SODIUM PHOSPHATE 10 MG/ML
INJECTION, SOLUTION INTRAMUSCULAR; INTRAVENOUS
Status: DISCONTINUED | OUTPATIENT
Start: 2024-12-20 | End: 2024-12-20 | Stop reason: SDUPTHER

## 2024-12-20 RX ORDER — HYDROCODONE BITARTRATE AND ACETAMINOPHEN 5; 325 MG/1; MG/1
1 TABLET ORAL EVERY 6 HOURS PRN
Qty: 28 TABLET | Refills: 0 | Status: SHIPPED | OUTPATIENT
Start: 2024-12-20 | End: 2024-12-27

## 2024-12-20 RX ORDER — LIDOCAINE HYDROCHLORIDE 20 MG/ML
INJECTION, SOLUTION INTRAVENOUS
Status: DISCONTINUED | OUTPATIENT
Start: 2024-12-20 | End: 2024-12-20 | Stop reason: SDUPTHER

## 2024-12-20 RX ORDER — SODIUM CHLORIDE 0.9 % (FLUSH) 0.9 %
5-40 SYRINGE (ML) INJECTION PRN
Status: DISCONTINUED | OUTPATIENT
Start: 2024-12-20 | End: 2024-12-20 | Stop reason: HOSPADM

## 2024-12-20 RX ORDER — ONDANSETRON 2 MG/ML
INJECTION INTRAMUSCULAR; INTRAVENOUS
Status: DISCONTINUED | OUTPATIENT
Start: 2024-12-20 | End: 2024-12-20 | Stop reason: SDUPTHER

## 2024-12-20 RX ORDER — BUPIVACAINE HYDROCHLORIDE 2.5 MG/ML
INJECTION, SOLUTION EPIDURAL; INFILTRATION; INTRACAUDAL PRN
Status: DISCONTINUED | OUTPATIENT
Start: 2024-12-20 | End: 2024-12-20 | Stop reason: ALTCHOICE

## 2024-12-20 RX ORDER — PROCHLORPERAZINE EDISYLATE 5 MG/ML
5 INJECTION INTRAMUSCULAR; INTRAVENOUS
Status: DISCONTINUED | OUTPATIENT
Start: 2024-12-20 | End: 2024-12-20 | Stop reason: HOSPADM

## 2024-12-20 RX ORDER — FENTANYL CITRATE 50 UG/ML
INJECTION, SOLUTION INTRAMUSCULAR; INTRAVENOUS
Status: DISCONTINUED | OUTPATIENT
Start: 2024-12-20 | End: 2024-12-20 | Stop reason: SDUPTHER

## 2024-12-20 RX ORDER — NALOXONE HYDROCHLORIDE 0.4 MG/ML
INJECTION, SOLUTION INTRAMUSCULAR; INTRAVENOUS; SUBCUTANEOUS PRN
Status: DISCONTINUED | OUTPATIENT
Start: 2024-12-20 | End: 2024-12-20 | Stop reason: HOSPADM

## 2024-12-20 RX ORDER — KETOROLAC TROMETHAMINE 30 MG/ML
INJECTION, SOLUTION INTRAMUSCULAR; INTRAVENOUS
Status: DISCONTINUED | OUTPATIENT
Start: 2024-12-20 | End: 2024-12-20 | Stop reason: SDUPTHER

## 2024-12-20 RX ORDER — DIPHENHYDRAMINE HYDROCHLORIDE 50 MG/ML
INJECTION INTRAMUSCULAR; INTRAVENOUS
Status: DISCONTINUED | OUTPATIENT
Start: 2024-12-20 | End: 2024-12-20 | Stop reason: SDUPTHER

## 2024-12-20 RX ORDER — PROPOFOL 10 MG/ML
INJECTION, EMULSION INTRAVENOUS
Status: DISCONTINUED | OUTPATIENT
Start: 2024-12-20 | End: 2024-12-20 | Stop reason: SDUPTHER

## 2024-12-20 RX ORDER — SODIUM CHLORIDE 0.9 % (FLUSH) 0.9 %
5-40 SYRINGE (ML) INJECTION EVERY 12 HOURS SCHEDULED
Status: DISCONTINUED | OUTPATIENT
Start: 2024-12-20 | End: 2024-12-20 | Stop reason: HOSPADM

## 2024-12-20 RX ADMIN — ONDANSETRON 4 MG: 2 INJECTION, SOLUTION INTRAMUSCULAR; INTRAVENOUS at 12:16

## 2024-12-20 RX ADMIN — SODIUM CHLORIDE, POTASSIUM CHLORIDE, SODIUM LACTATE AND CALCIUM CHLORIDE: 600; 310; 30; 20 INJECTION, SOLUTION INTRAVENOUS at 10:48

## 2024-12-20 RX ADMIN — LIDOCAINE HYDROCHLORIDE 100 MG: 20 INJECTION INTRAVENOUS at 11:45

## 2024-12-20 RX ADMIN — FENTANYL CITRATE 100 MCG: 50 INJECTION, SOLUTION INTRAMUSCULAR; INTRAVENOUS at 11:45

## 2024-12-20 RX ADMIN — KETOROLAC TROMETHAMINE 30 MG: 30 INJECTION, SOLUTION INTRAMUSCULAR at 12:22

## 2024-12-20 RX ADMIN — DEXAMETHASONE SODIUM PHOSPHATE 10 MG: 10 INJECTION INTRAMUSCULAR; INTRAVENOUS at 11:49

## 2024-12-20 RX ADMIN — PROPOFOL 200 MG: 10 INJECTION, EMULSION INTRAVENOUS at 11:45

## 2024-12-20 RX ADMIN — WATER 2000 MG: 1 INJECTION INTRAMUSCULAR; INTRAVENOUS; SUBCUTANEOUS at 11:47

## 2024-12-20 RX ADMIN — DIPHENHYDRAMINE HYDROCHLORIDE 12.5 MG: 50 INJECTION INTRAMUSCULAR; INTRAVENOUS at 12:09

## 2024-12-20 RX ADMIN — MIDAZOLAM 2 MG: 1 INJECTION INTRAMUSCULAR; INTRAVENOUS at 11:43

## 2024-12-20 RX ADMIN — FENTANYL CITRATE 50 MCG: 50 INJECTION, SOLUTION INTRAMUSCULAR; INTRAVENOUS at 12:18

## 2024-12-20 RX ADMIN — FENTANYL CITRATE 50 MCG: 50 INJECTION, SOLUTION INTRAMUSCULAR; INTRAVENOUS at 11:55

## 2024-12-20 RX ADMIN — FENTANYL CITRATE 50 MCG: 50 INJECTION, SOLUTION INTRAMUSCULAR; INTRAVENOUS at 12:02

## 2024-12-20 ASSESSMENT — PAIN - FUNCTIONAL ASSESSMENT
PAIN_FUNCTIONAL_ASSESSMENT: 0-10
PAIN_FUNCTIONAL_ASSESSMENT: 0-10
PAIN_FUNCTIONAL_ASSESSMENT: ACTIVITIES ARE NOT PREVENTED
PAIN_FUNCTIONAL_ASSESSMENT: 0-10

## 2024-12-20 ASSESSMENT — PAIN DESCRIPTION - DESCRIPTORS
DESCRIPTORS: ACHING
DESCRIPTORS: BURNING

## 2024-12-20 ASSESSMENT — LIFESTYLE VARIABLES: SMOKING_STATUS: 1

## 2024-12-20 NOTE — PROGRESS NOTES
Patient has a raised small area under her tongue. Will have Dr. Danielle look at it before she leaves

## 2024-12-20 NOTE — OP NOTE
Operative Note      Patient: Dasia Prasad  YOB: 1985  MRN: [id number]    Date of Procedure: 12/20/2024    Pre-Op Diagnosis Codes:      * Right knee DJD [M17.11]    Post-Op Diagnosis: Same       Procedure(s):  RIGHT KNEE ARTHROSCOPY WITH CHONDROPLASTY AND DEBRIDEMENT-12/20/24    Surgeon(s):  Andrei Renteria DO    Assistant:   Resident: Jose Martin DO; Jonathon Wong DO    Anesthesia: General    Estimated Blood Loss (mL): less than 100     Complications: None    Specimens:   * No specimens in log *    Implants:  * No implants in log *      Drains: * No LDAs found *    Findings:  Infection Present At Time Of Surgery (PATOS) (choose all levels that have infection present):  No infection present  Other Findings: as above    Detailed Description of Procedure:   below      ESTIMATED BLOOD LOSS: Minimal.   COMPLICATIONS: None.   OPERATIVE PROCEDURE: The patient was taken to the operative suite and was   given general anesthesia. The Right knee was identified with preoperative time-out. I applied a tourniquet to the  thigh, placed the  leg in a legholder, prepped and draped the leg in sterile fashion.  I outlined an   incision along the anteromedial and anterolateral aspects of the knee.  An incision was then made in the anterior medial and lateral aspects of the knee with an 11 blade. A blunt trocar was then inserted within the knee and I carried out a diagnostic arthroscopy.     The patient had evidence of synovitis within the suprapatellar pouch, medial and lateral aspects of the knee.  There was a large suprapatellar, medial and infrapatellar plica.    The patellar surface was grade III-IV  and trochlear surface was grade III-IV.  I then advanced the scope into the intracondylar notch.  The patients ACL/ PCL were intact.   I then advanced the scope into the medial compartment. The medial femoral condyle had grade III defects measuring 2x2cm.  The medial tibial plateau had stable defects measuring

## 2024-12-20 NOTE — DISCHARGE INSTRUCTIONS
[facility]  [address] [address]  Phone [phone]      Arthroscopy Post-Op Instructions    [facility]  [phone]  Andrei Renteria D.O.      Change your operative dressing on post-op day #3. Use Band-Aids over the incisions.    You may shower with soap and water on post-op day #5. Do not soak in the tub.    Use the ice pack on your knee as much as tolerated over the next 2 -3 days. This will help to keep the swelling down and minimize the pain.    You may bend your knee as tolerated.    You are to walk with crutches for 1-2 days. Weight bearing as tolerated, you may stop using the crutches when you are comfortable.    Take one aspirin ( regular strength, 325 mg.), (enteric coated if you have stomach problems) twice a day for 2 weeks.    Take pain medication as prescribed. If you anticipate the need for a refill on your medication and the weekend is approaching, please call the office by noon on Friday. No refills will be called in over the weekend. Do not take TYLENOL or Tylenol products while taking the prescribed pain medicine.    Resume regular diet and medications (unless otherwise directed by your doctor.)    You should have a responsible adult with you for 24 hours.    If any problems occur or if you have any further questions, please call your doctor as soon as possible. If you find that you cannot reach your doctor but feel that your condition needs a doctor’s attention go to an emergency room.

## 2024-12-20 NOTE — ANESTHESIA POSTPROCEDURE EVALUATION
Department of Anesthesiology  Postprocedure Note    Patient: Dasia Prasad  MRN: [id number]  YOB: 1985  Date of evaluation: 12/20/2024    Procedure Summary       Date: 12/20/24 Room / Location: [address]    Anesthesia Start: 1140 Anesthesia Stop: 1233    Procedure: RIGHT KNEE ARTHROSCOPY WITH CHONDROPLASTY AND DEBRIDEMENT-12/20/24 (Right: Knee) Diagnosis:       Right knee DJD      (Right knee DJD [M17.11])    Surgeons: Andrei Renteria DO Responsible Provider: Shara Danielle DO    Anesthesia Type: General ASA Status: 2            Anesthesia Type: General    Wen Phase I: Wen Score: 8    Wen Phase II: Wen Score: 10    Anesthesia Post Evaluation    Patient location during evaluation: PACU  Patient participation: complete - patient participated  Level of consciousness: awake and alert  Airway patency: patent  Nausea & Vomiting: no nausea and no vomiting  Cardiovascular status: hemodynamically stable  Respiratory status: acceptable  Hydration status: euvolemic  Comments: Patient states just before discharged she felt something under her tongue.  Examined patient and appears to be a small raised blister under her tongue.  She is able to eat and drink without difficulty.  States appears to be decreasing in size.  Told to watch for decrease in size and call family physician or go to ER if increasing in size  Pain management: adequate    No notable events documented.

## 2024-12-20 NOTE — H&P
Updated H&P    Chief Complaint   Patient presents with    Knee Pain       Right Knee F/U, still having pain, had Insurance issues         Dasia Prasad returns today for follow-up of her right knee pain. she reports this is worse than when I saw her last.  The patient's pain level is a 7/10. The previous treatment was successful. She works as a . She has been taking Tylenol and Advil for pain relief.     The patient has failed all attempts at conservative treatment including: cortisone injection, visco injections, zilretta injections, physician directed HEP, nsaids, pain medication, OTC medication, ice, heat, use of assistive device and activity restriction.        Past Medical History        Past Medical History:   Diagnosis Date    Hypertension      Migraine           Past Surgical History         Past Surgical History:   Procedure Laterality Date    APPENDECTOMY        SECTION        SECTION N/A 2020     REPEAT  SECTION performed by Osvaldo Reed MD at [facility] L&D OR    HYSTERECTOMY (CERVIX STATUS UNKNOWN) N/A 6/3/2024     ROBOTIC ASSISTED TOTAL LAPAROSCOPIC HYSTERECTOMY performed by Horacio Lindsey MD at [facility] OR    TUBAL LIGATION               Current Medication      Current Outpatient Medications:     lisinopril (PRINIVIL;ZESTRIL) 20 MG tablet, Take 1 tablet by mouth daily, Disp: , Rfl:      Allergies   No Known Allergies     Social History               Socioeconomic History    Marital status: Single       Spouse name: Not on file    Number of children: Not on file    Years of education: Not on file    Highest education level: Not on file   Occupational History    Not on file   Tobacco Use    Smoking status: Every Day       Current packs/day: 0.50       Average packs/day: 0.6 packs/day for 27.8 years (17.4 ttl pk-yrs)       Types: Cigarettes       Start date:     Smokeless tobacco: Never   Vaping Use    Vaping status: Never Used   Substance and Sexual

## 2024-12-26 ENCOUNTER — TELEPHONE (OUTPATIENT)
Dept: ORTHOPEDIC SURGERY | Age: 39
End: 2024-12-26

## 2024-12-26 NOTE — TELEPHONE ENCOUNTER
Patient called in today needs to r/s her post op appt due to work, also wants to know if she can drive on 1/3 before rescheduling. Best call back is [phone]

## 2024-12-27 NOTE — TELEPHONE ENCOUNTER
LM for patient to call office back. Should be okay to drive if she is not on pain medication and feels comfortable doing so. Stitches should be removed as close to that date as possible.

## 2024-12-31 ENCOUNTER — TELEPHONE (OUTPATIENT)
Dept: SURGERY | Age: 39
End: 2024-12-31

## 2024-12-31 ENCOUNTER — INITIAL CONSULT (OUTPATIENT)
Dept: SURGERY | Age: 39
End: 2024-12-31
Payer: COMMERCIAL

## 2024-12-31 VITALS
WEIGHT: 275 LBS | HEIGHT: 68 IN | TEMPERATURE: 97.5 F | HEART RATE: 104 BPM | DIASTOLIC BLOOD PRESSURE: 75 MMHG | BODY MASS INDEX: 41.68 KG/M2 | SYSTOLIC BLOOD PRESSURE: 126 MMHG

## 2024-12-31 DIAGNOSIS — K86.9 PANCREATIC LESION: Primary | ICD-10-CM

## 2024-12-31 PROCEDURE — 99203 OFFICE O/P NEW LOW 30 MIN: CPT | Performed by: SURGERY

## 2024-12-31 PROCEDURE — G8484 FLU IMMUNIZE NO ADMIN: HCPCS | Performed by: SURGERY

## 2024-12-31 PROCEDURE — G8427 DOCREV CUR MEDS BY ELIG CLIN: HCPCS | Performed by: SURGERY

## 2024-12-31 PROCEDURE — G8417 CALC BMI ABV UP PARAM F/U: HCPCS | Performed by: SURGERY

## 2024-12-31 PROCEDURE — 4004F PT TOBACCO SCREEN RCVD TLK: CPT | Performed by: SURGERY

## 2024-12-31 RX ORDER — SENNOSIDES 8.6 MG/1
2 TABLET, COATED ORAL DAILY
COMMUNITY
Start: 2024-12-20

## 2024-12-31 NOTE — TELEPHONE ENCOUNTER
Per the order of Dr. Du, patient has been scheduled for EUS with biopsy on 2.17.2025.  Patient provided with procedure information during office visit and scheduled for follow up appointment to review results.  Patient instructed to please contact our office with any questions.    Patient referred by Catrachito Gastro    Procedure scheduled through [facility].  Dr. Du to enter orders.    Electronically signed by Tanna Jiménez on 12/31/24 at 8:33 AM EST

## 2024-12-31 NOTE — PROGRESS NOTES
General Surgery History and Physical  [facility]    Patient's Name/Date of Birth: Dasia Prasad / 1985    Date: 2024     Surgeon: Carl Du MD    PCP: Aquilino Harrell PA     Chief Complaint: pancreatic lesion    HPI:   Dasia Prasad is a 39 y.o. female who presents for evaluation of pancreatic lesion. She had abd US performed for abd pain which revealed 1.2cm pancreatic body lesion. She then had MRI abd that revealed 8mm subtle focus in the pancreatic body. She was referred for EUS for further evaluation. She has no personal or family history or pancreatic neoplasm.    Patient Active Problem List   Diagnosis    Obesity    Migraine headache    Fatigue    Right knee DJD    Pancreatic lesion       Past Medical History:   Diagnosis Date    Hypertension     Migraine        Past Surgical History:   Procedure Laterality Date    APPENDECTOMY       SECTION       SECTION N/A 2020    REPEAT  SECTION performed by Osvaldo Reed MD at [facility] L&D OR    HYSTERECTOMY (CERVIX STATUS UNKNOWN) N/A 6/3/2024    ROBOTIC ASSISTED TOTAL LAPAROSCOPIC HYSTERECTOMY performed by Horacio Lindsey MD at [facility] OR    KNEE ARTHROSCOPY Right 2024    RIGHT KNEE ARTHROSCOPY WITH CHONDROPLASTY AND DEBRIDEMENT-24 performed by Andrei Renteria DO at [facility] OR    TUBAL LIGATION         No Known Allergies    The patient has a family history that is negative for severe cardiovascular or respiratory issues, negative for reaction to anesthesia.     Time spent reviewing past medical, surgical, social and family history, vitals, nursing assessment and images. No changes from above documented history.    Social History     Socioeconomic History    Marital status: Single     Spouse name: Not on file    Number of children: Not on file    Years of education: Not on file    Highest education level: Not on file   Occupational History    Not on file   Tobacco Use

## 2025-01-02 ENCOUNTER — HOSPITAL ENCOUNTER (OUTPATIENT)
Dept: SLEEP CENTER | Age: 40
Discharge: HOME | End: 2025-01-02
Payer: COMMERCIAL

## 2025-01-02 DIAGNOSIS — G47.33 OBSTRUCTIVE SLEEP APNEA: ICD-10-CM

## 2025-01-02 PROCEDURE — 95810 POLYSOM 6/> YRS 4/> PARAM: CPT

## 2025-01-03 ENCOUNTER — OFFICE VISIT (OUTPATIENT)
Dept: ORTHOPEDIC SURGERY | Age: 40
End: 2025-01-03

## 2025-01-03 VITALS — HEIGHT: 68 IN | BODY MASS INDEX: 41.68 KG/M2 | WEIGHT: 275 LBS

## 2025-01-03 DIAGNOSIS — Z98.890 S/P RIGHT KNEE ARTHROSCOPY: Primary | ICD-10-CM

## 2025-01-03 PROCEDURE — 99024 POSTOP FOLLOW-UP VISIT: CPT

## 2025-01-03 NOTE — PROGRESS NOTES
Subjective:        Dasia Prasad is here for follow-up after right knee arthroscopy. Findings at surgery: djd knee.   Pain is controlled with current analgesics.  Medication(s) being used: ibuprofen (OTC).. She denies fever, wound drainage, increasing redness, pus, increasing pain, increasing swelling. Post op problems reported: none.  She is ambulating without aid.         Objective:           General :    alert, appears stated age, and cooperative   Gait:  Normal.   Sutures:   Sutures out.   Incision:  healing well, no significant drainage, no dehiscence, no significant erythema   Tenderness:  mild   Flexion ROM:  full range of motion   Extension ROM:  full range of motion   Effusion:  no   DVT Evaluation:  No evidence of DVT seen on physical exam.           Assessment:     Encounter Diagnosis   Name Primary?    S/P right knee arthroscopy Yes         Plan:      Surgical pictures from the surgery were reveiwed with the patient  HEP  Sutures removed today.  Follow up: 4 weeks.

## 2025-02-18 ENCOUNTER — OFFICE VISIT (OUTPATIENT)
Dept: ORTHOPEDIC SURGERY | Age: 40
End: 2025-02-18

## 2025-02-18 VITALS — OXYGEN SATURATION: 99 % | WEIGHT: 270 LBS | HEIGHT: 68 IN | BODY MASS INDEX: 40.92 KG/M2 | RESPIRATION RATE: 18 BRPM

## 2025-02-18 DIAGNOSIS — Z98.890 S/P RIGHT KNEE ARTHROSCOPY: Primary | ICD-10-CM

## 2025-02-18 PROCEDURE — 99024 POSTOP FOLLOW-UP VISIT: CPT | Performed by: ORTHOPAEDIC SURGERY

## 2025-02-18 NOTE — PROGRESS NOTES
Subjective:        Dasia Prasad is here for follow-up after right knee arthroscopy. Findings at surgery: djd knee.   Pain is controlled with current analgesics.  Medication(s) being used: ibuprofen (OTC).. She denies fever, wound drainage, increasing redness, pus, increasing pain, increasing swelling. Post op problems reported: none.  She is ambulating without aid. She is better than before the surgery. She has good days and bad days.        Objective:           General :    alert, appears stated age, and cooperative   Gait:  Normal.   Sutures:   Sutures out.   Incision:  healing well, no significant drainage, no dehiscence, no significant erythema   Tenderness:  mild   Flexion ROM:  full range of motion   Extension ROM:  full range of motion   Effusion:  no   DVT Evaluation:  No evidence of DVT seen on physical exam.           Assessment:     Encounter Diagnosis   Name Primary?    S/P right knee arthroscopy Yes           Plan:      Surgical pictures from the surgery were reveiwed with the patient  HEP  Sutures removed today.  Follow up: prn

## 2025-04-24 ENCOUNTER — HOSPITAL ENCOUNTER (OUTPATIENT)
Dept: ULTRASOUND IMAGING | Age: 40
Discharge: HOME | End: 2025-04-24
Payer: COMMERCIAL

## 2025-04-24 DIAGNOSIS — E04.1 LEFT THYROID NODULE: ICD-10-CM

## 2025-04-24 PROCEDURE — 88173 CYTOPATH EVAL FNA REPORT: CPT

## 2025-04-24 PROCEDURE — 10005 FNA BX W/US GDN 1ST LES: CPT

## 2025-04-24 PROCEDURE — 88305 TISSUE EXAM BY PATHOLOGIST: CPT

## 2025-04-24 PROCEDURE — 88172 CYTP DX EVAL FNA 1ST EA SITE: CPT

## 2025-04-25 LAB — NON-GYN CYTOLOGY REPORT: NORMAL

## 2025-05-22 ENCOUNTER — OFFICE VISIT (OUTPATIENT)
Dept: SLEEP CENTER | Age: 40
End: 2025-05-22
Payer: COMMERCIAL

## 2025-05-22 VITALS
HEIGHT: 68 IN | OXYGEN SATURATION: 97 % | BODY MASS INDEX: 39.99 KG/M2 | WEIGHT: 263.89 LBS | HEART RATE: 91 BPM | DIASTOLIC BLOOD PRESSURE: 68 MMHG | SYSTOLIC BLOOD PRESSURE: 103 MMHG | TEMPERATURE: 98.2 F

## 2025-05-22 DIAGNOSIS — G47.33 OSA (OBSTRUCTIVE SLEEP APNEA): Primary | ICD-10-CM

## 2025-05-22 DIAGNOSIS — E04.9 SUBSTERNAL GOITER: ICD-10-CM

## 2025-05-22 DIAGNOSIS — E66.01 MORBID OBESITY (HCC): ICD-10-CM

## 2025-05-22 PROCEDURE — 4004F PT TOBACCO SCREEN RCVD TLK: CPT | Performed by: INTERNAL MEDICINE

## 2025-05-22 PROCEDURE — 99204 OFFICE O/P NEW MOD 45 MIN: CPT | Performed by: INTERNAL MEDICINE

## 2025-05-22 PROCEDURE — G8427 DOCREV CUR MEDS BY ELIG CLIN: HCPCS | Performed by: INTERNAL MEDICINE

## 2025-05-22 PROCEDURE — G8417 CALC BMI ABV UP PARAM F/U: HCPCS | Performed by: INTERNAL MEDICINE

## 2025-05-22 RX ORDER — MINOCYCLINE HYDROCHLORIDE 100 MG/1
100 CAPSULE ORAL 2 TIMES DAILY
COMMUNITY
Start: 2025-05-07

## 2025-05-22 RX ORDER — CHLORHEXIDINE GLUCONATE ORAL RINSE 1.2 MG/ML
15 SOLUTION DENTAL 2 TIMES DAILY
COMMUNITY
Start: 2025-03-03

## 2025-05-22 RX ORDER — POLYETHYLENE GLYCOL 3350 17 G/17G
17 POWDER ORAL ONCE
COMMUNITY
Start: 2025-04-13

## 2025-05-22 ASSESSMENT — SLEEP AND FATIGUE QUESTIONNAIRES
HOW LIKELY ARE YOU TO NOD OFF OR FALL ASLEEP WHILE WATCHING TV: HIGH CHANCE OF DOZING
HOW LIKELY ARE YOU TO NOD OFF OR FALL ASLEEP WHILE SITTING QUIETLY AFTER LUNCH WITHOUT ALCOHOL: MODERATE CHANCE OF DOZING
HOW LIKELY ARE YOU TO NOD OFF OR FALL ASLEEP IN A CAR, WHILE STOPPED FOR A FEW MINUTES IN TRAFFIC: WOULD NEVER DOZE
HOW LIKELY ARE YOU TO NOD OFF OR FALL ASLEEP WHILE SITTING INACTIVE IN A PUBLIC PLACE: MODERATE CHANCE OF DOZING
ESS TOTAL SCORE: 17
HOW LIKELY ARE YOU TO NOD OFF OR FALL ASLEEP WHILE SITTING AND TALKING TO SOMEONE: SLIGHT CHANCE OF DOZING
HOW LIKELY ARE YOU TO NOD OFF OR FALL ASLEEP WHILE SITTING AND READING: HIGH CHANCE OF DOZING
HOW LIKELY ARE YOU TO NOD OFF OR FALL ASLEEP WHILE LYING DOWN TO REST IN THE AFTERNOON WHEN CIRCUMSTANCES PERMIT: HIGH CHANCE OF DOZING
HOW LIKELY ARE YOU TO NOD OFF OR FALL ASLEEP WHEN YOU ARE A PASSENGER IN A CAR FOR AN HOUR WITHOUT A BREAK: HIGH CHANCE OF DOZING

## 2025-05-22 ASSESSMENT — ENCOUNTER SYMPTOMS
ALLERGIC/IMMUNOLOGIC NEGATIVE: 1
EYES NEGATIVE: 1
RESPIRATORY NEGATIVE: 1
GASTROINTESTINAL NEGATIVE: 1

## 2025-05-22 NOTE — PROGRESS NOTES
Progress Note    Dasia Prasad  1985    CC:Sleep Apnea       HPI : 39 year old female, history of snoring, wakes up un refreshed, naps sometime, tired, weight gain and is on medication to lose weight. She had PSG and it showed mild VENESSA. She has substernal goiter, had chest CT and US.     Past Medical History:   Diagnosis Date    Hypertension     Migraine       Past Surgical History:   Procedure Laterality Date    APPENDECTOMY       SECTION       SECTION N/A 2020    REPEAT  SECTION performed by Osvaldo Reed MD at [facility] L&D OR    HYSTERECTOMY (CERVIX STATUS UNKNOWN) N/A 6/3/2024    ROBOTIC ASSISTED TOTAL LAPAROSCOPIC HYSTERECTOMY performed by Horacio Lindsey MD at [facility] OR    KNEE ARTHROSCOPY Right 2024    RIGHT KNEE ARTHROSCOPY WITH CHONDROPLASTY AND DEBRIDEMENT-24 performed by Andrei Renteria DO at [facility] OR    TUBAL LIGATION        Family History   Problem Relation Age of Onset    High Cholesterol Mother       Social History     Socioeconomic History    Marital status:      Spouse name: None    Number of children: None    Years of education: None    Highest education level: None   Tobacco Use    Smoking status: Every Day     Current packs/day: 0.75     Average packs/day: 0.8 packs/day for 21.4 years (16.0 ttl pk-yrs)     Types: Cigarettes     Start date:     Smokeless tobacco: Never   Vaping Use    Vaping status: Never Used   Substance and Sexual Activity    Alcohol use: No    Drug use: No    Sexual activity: Yes     Birth control/protection: I.U.D.        Patient has no known allergies.     Current Outpatient Medications:     chlorhexidine (PERIDEX) 0.12 % solution, Swish and spit 15 mLs 2 times daily, Disp: , Rfl:     minocycline (MINOCIN;DYNACIN) 100 MG capsule, Take 1 capsule by mouth 2 times daily, Disp: , Rfl:     polyethylene glycol (MIRALAX) 17 GM/SCOOP POWD powder, Take 17 g by mouth once, Disp: , Rfl:     SENNA-TIME 8.6 MG

## 2025-05-27 ENCOUNTER — ANESTHESIA EVENT (OUTPATIENT)
Dept: ENDOSCOPY | Age: 40
End: 2025-05-27
Payer: COMMERCIAL

## 2025-05-27 NOTE — PROGRESS NOTES
[facility]                                                                                                                    PRE OP INSTRUCTIONS FOR  Dasia Prasad        Date: 5/27/2025    Date of surgery: 05/27/25   Arrival Time: 7AM Hospital will call you between 5pm and 7pm with your final arrival time for surgery. Go to     front of hospital and check in at information desk.    Nothing by mouth (NPO) as instructed. May have clear liquids up to 2 hours prior to surgery. Nothing solid after midnight. Examples: water, apple juice, black coffee, plain tea    Take the following medications with a small sip of water on the morning of Surgery:     Diabetics may take half the evening dose of insulin but none after midnight.  If you feel symptomatic or have low blood sugar morning of surgery drink 1-2 ounces of apple juice only. If you take a weekly insulin injection _______________, stop 7 days prior to surgery. If you take _______________, stop 3-4 days prior to surgery.    Aspirin, Ibuprofen, Advil, Naproxen, other Anti-inflammatory products should be stopped before surgery as directed by your surgeon, cardiologist, or primary care Doctor. Herbal supplements and Vitamin E should be stopped five days prior.  May take Tylenol unless instructed otherwise by your surgeon.    Check with your Doctor regarding stopping Plavix, Coumadin, Lovenox, Eliquis, Effient, or other blood thinners such as, pradaxa, lixiana, xaralto and savaysa.    Do not smoke, chew tobacco, vape, or use illicit drugs and do not drink any alcoholic beverages 24 hours prior to surgery.    You may brush your teeth the morning of surgery.      You MUST make arrangements for a responsible adult, 18 and over, to take you home after your surgery. You will not be allowed to leave alone or drive yourself home.  You will need someone stay with you the first 24 hrs. Your surgery will be cancelled if you do not have a ride home or

## 2025-05-28 ENCOUNTER — ANESTHESIA (OUTPATIENT)
Dept: ENDOSCOPY | Age: 40
End: 2025-05-28
Payer: COMMERCIAL

## 2025-05-28 ENCOUNTER — HOSPITAL ENCOUNTER (OUTPATIENT)
Age: 40
Setting detail: OUTPATIENT SURGERY
Discharge: HOME | End: 2025-05-28
Attending: SURGERY | Admitting: SURGERY
Payer: COMMERCIAL

## 2025-05-28 VITALS
DIASTOLIC BLOOD PRESSURE: 71 MMHG | HEIGHT: 68 IN | WEIGHT: 261 LBS | BODY MASS INDEX: 39.56 KG/M2 | SYSTOLIC BLOOD PRESSURE: 116 MMHG | OXYGEN SATURATION: 98 % | TEMPERATURE: 96.8 F | HEART RATE: 75 BPM | RESPIRATION RATE: 20 BRPM

## 2025-05-28 DIAGNOSIS — K86.9 PANCREATIC LESION: ICD-10-CM

## 2025-05-28 PROCEDURE — 2580000003 HC RX 258

## 2025-05-28 PROCEDURE — 6360000002 HC RX W HCPCS: Performed by: NURSE ANESTHETIST, CERTIFIED REGISTERED

## 2025-05-28 PROCEDURE — C1753 CATH, INTRAVAS ULTRASOUND: HCPCS | Performed by: SURGERY

## 2025-05-28 PROCEDURE — 88305 TISSUE EXAM BY PATHOLOGIST: CPT

## 2025-05-28 PROCEDURE — 43242 EGD US FINE NEEDLE BX/ASPIR: CPT | Performed by: SURGERY

## 2025-05-28 PROCEDURE — 2709999900 HC NON-CHARGEABLE SUPPLY: Performed by: SURGERY

## 2025-05-28 PROCEDURE — 3609020800 HC EGD W/EUS FNA: Performed by: SURGERY

## 2025-05-28 PROCEDURE — 3700000000 HC ANESTHESIA ATTENDED CARE: Performed by: SURGERY

## 2025-05-28 PROCEDURE — 88342 IMHCHEM/IMCYTCHM 1ST ANTB: CPT

## 2025-05-28 PROCEDURE — 88173 CYTOPATH EVAL FNA REPORT: CPT

## 2025-05-28 PROCEDURE — 88360 TUMOR IMMUNOHISTOCHEM/MANUAL: CPT

## 2025-05-28 PROCEDURE — 2720000010 HC SURG SUPPLY STERILE: Performed by: SURGERY

## 2025-05-28 PROCEDURE — 3700000001 HC ADD 15 MINUTES (ANESTHESIA): Performed by: SURGERY

## 2025-05-28 PROCEDURE — 88341 IMHCHEM/IMCYTCHM EA ADD ANTB: CPT

## 2025-05-28 PROCEDURE — 7100000010 HC PHASE II RECOVERY - FIRST 15 MIN: Performed by: SURGERY

## 2025-05-28 PROCEDURE — 7100000011 HC PHASE II RECOVERY - ADDTL 15 MIN: Performed by: SURGERY

## 2025-05-28 RX ORDER — PROPOFOL 10 MG/ML
INJECTION, EMULSION INTRAVENOUS
Status: DISCONTINUED | OUTPATIENT
Start: 2025-05-28 | End: 2025-05-28 | Stop reason: SDUPTHER

## 2025-05-28 RX ORDER — MIDAZOLAM HYDROCHLORIDE 1 MG/ML
INJECTION, SOLUTION INTRAMUSCULAR; INTRAVENOUS
Status: DISCONTINUED | OUTPATIENT
Start: 2025-05-28 | End: 2025-05-28 | Stop reason: SDUPTHER

## 2025-05-28 RX ORDER — SODIUM CHLORIDE, SODIUM LACTATE, POTASSIUM CHLORIDE, CALCIUM CHLORIDE 600; 310; 30; 20 MG/100ML; MG/100ML; MG/100ML; MG/100ML
INJECTION, SOLUTION INTRAVENOUS CONTINUOUS
Status: DISCONTINUED | OUTPATIENT
Start: 2025-05-28 | End: 2025-05-28 | Stop reason: HOSPADM

## 2025-05-28 RX ORDER — SODIUM CHLORIDE 0.9 % (FLUSH) 0.9 %
5-40 SYRINGE (ML) INJECTION PRN
Status: DISCONTINUED | OUTPATIENT
Start: 2025-05-28 | End: 2025-05-28 | Stop reason: HOSPADM

## 2025-05-28 RX ORDER — SODIUM CHLORIDE 0.9 % (FLUSH) 0.9 %
5-40 SYRINGE (ML) INJECTION EVERY 12 HOURS SCHEDULED
Status: DISCONTINUED | OUTPATIENT
Start: 2025-05-28 | End: 2025-05-28 | Stop reason: HOSPADM

## 2025-05-28 RX ORDER — SODIUM CHLORIDE 9 MG/ML
INJECTION, SOLUTION INTRAVENOUS PRN
Status: DISCONTINUED | OUTPATIENT
Start: 2025-05-28 | End: 2025-05-28 | Stop reason: HOSPADM

## 2025-05-28 RX ADMIN — MIDAZOLAM 2 MG: 1 INJECTION INTRAMUSCULAR; INTRAVENOUS at 08:52

## 2025-05-28 RX ADMIN — PROPOFOL 40 MG: 10 INJECTION, EMULSION INTRAVENOUS at 08:58

## 2025-05-28 RX ADMIN — PROPOFOL 70 MG: 10 INJECTION, EMULSION INTRAVENOUS at 08:52

## 2025-05-28 RX ADMIN — PROPOFOL 50 MG: 10 INJECTION, EMULSION INTRAVENOUS at 08:55

## 2025-05-28 RX ADMIN — SODIUM CHLORIDE, SODIUM LACTATE, POTASSIUM CHLORIDE, AND CALCIUM CHLORIDE: .6; .31; .03; .02 INJECTION, SOLUTION INTRAVENOUS at 07:25

## 2025-05-28 RX ADMIN — PROPOFOL 20 MG: 10 INJECTION, EMULSION INTRAVENOUS at 08:57

## 2025-05-28 ASSESSMENT — PAIN - FUNCTIONAL ASSESSMENT
PAIN_FUNCTIONAL_ASSESSMENT: NONE - DENIES PAIN
PAIN_FUNCTIONAL_ASSESSMENT: 0-10
PAIN_FUNCTIONAL_ASSESSMENT: 0-10

## 2025-05-28 NOTE — ANESTHESIA PRE PROCEDURE
Department of Anesthesiology  Preprocedure Note       Name:  Dasia Prasad   Age:  39 y.o.  :  1985                                          MRN:  [id number]         Date:  2025      Surgeon: Surgeon(s):  Carl Du MD    Procedure: Procedure(s):  ESOPHAGOGASTRODUODENOSCOPY ENDOSCOPIC ULTRASOUND FINE NEEDLE ASPIRATION    Medications prior to admission:   Prior to Admission medications    Medication Sig Start Date End Date Taking? Authorizing Provider   minocycline (MINOCIN;DYNACIN) 100 MG capsule Take 1 capsule by mouth 2 times daily 25   Clemente Cook MD   polyethylene glycol (MIRALAX) 17 GM/SCOOP POWD powder Take 17 g by mouth once 25   Clemente Cook MD   SENNA-TIME 8.6 MG tablet Take 2 tablets by mouth daily 24   Clemente Cook MD   lisinopril-hydroCHLOROthiazide (PRINZIDE;ZESTORETIC) 20-12.5 MG per tablet Take 1 tablet by mouth 2 times daily 24   Clemente Cook MD       Current medications:    Current Facility-Administered Medications   Medication Dose Route Frequency Provider Last Rate Last Admin    sodium chloride flush 0.9 % injection 5-40 mL  5-40 mL IntraVENous 2 times per day Leno Szymanski APRN - CNP        sodium chloride flush 0.9 % injection 5-40 mL  5-40 mL IntraVENous PRN Leno Szymanski APRN - CNP        0.9 % sodium chloride infusion   IntraVENous PRN Leno Szymanski APRN - CNP        lactated ringers infusion   IntraVENous Continuous Leno Szymanski APRN -  mL/hr at 25 0725 New Bag at 25 0725       Allergies:  No Known Allergies    Problem List:    Patient Active Problem List   Diagnosis Code    Obesity E66.9    Migraine headache G43.909    Fatigue R53.83    Right knee DJD M17.11    Pancreatic lesion K86.9       Past Medical History:        Diagnosis Date    Arthritis     Hypertension     Migraine        Past Surgical History:        Procedure Laterality Date    APPENDECTOMY  1998     SECTION

## 2025-05-28 NOTE — ANESTHESIA POSTPROCEDURE EVALUATION
Department of Anesthesiology  Postprocedure Note    Patient: Dasia Prasad  MRN: [id number]  YOB: 1985  Date of evaluation: 5/28/2025    Procedure Summary       Date: 05/28/25 Room / Location: [place] [address] / [facility]    Anesthesia Start: 0848 Anesthesia Stop: 0910    Procedure: ESOPHAGOGASTRODUODENOSCOPY ENDOSCOPIC ULTRASOUND FINE NEEDLE ASPIRATION Diagnosis:       Pancreatic lesion      (Pancreatic lesion [K86.9])    Surgeons: Carl Du MD Responsible Provider: Joey De La Garza DO    Anesthesia Type: MAC ASA Status: 2            Anesthesia Type: No value filed.    Wen Phase I: Wen Score: 10    Wen Phase II: Wen Score: 10    Anesthesia Post Evaluation    Patient location during evaluation: PACU  Patient participation: complete - patient participated  Level of consciousness: awake and alert  Pain score: 0  Airway patency: patent  Nausea & Vomiting: no nausea and no vomiting  Cardiovascular status: blood pressure returned to baseline and hemodynamically stable  Respiratory status: acceptable  Hydration status: stable  Pain management: adequate    No notable events documented.

## 2025-05-28 NOTE — OP NOTE
Operative Note      Patient: Dasia Prasad  YOB: 1985  MRN: [id number]    Date of Procedure: 5/28/2025    Pre-Op Diagnosis Codes:      * Pancreatic lesion [K86.9]    Post-Op Diagnosis: Same       Procedure(s):  ESOPHAGOGASTRODUODENOSCOPY ENDOSCOPIC ULTRASOUND FINE NEEDLE ASPIRATION    Surgeon(s):  Carl Du MD    Assistant:   Surgical Assistant: Willa Crews RN    Anesthesia: Monitor Anesthesia Care    Estimated Blood Loss (mL): less than 50     Complications: None    Specimens:   ID Type Source Tests Collected by Time Destination   A : pancreatic neck FNA for cyto Tissue Fine Needle Aspirate CYTOLOGY, NON-GYN Carl Du MD 5/28/2025 0855    B : pancreatic neck FNA Tissue Fine Needle Aspirate SURGICAL PATHOLOGY Carl Du MD 5/28/2025 0856        Implants:  * No implants in log *      Drains: * No LDAs found *    Findings:  Infection Present At Time Of Surgery (PATOS) (choose all levels that have infection present):  No infection present  Other Findings: See below    Detailed Description of Procedure:     Indications and History:  The patient is a 39 y.o. female.  The risks, benefits, complications, treatment options and expected outcomes were discussed with the patient.  The possibilities of reaction to medication, pulmonary aspiration, perforation of the gastrointestinal tract, bleeding requiring transfusion or operation, respiratory failure requiring placement on a ventilator and failure to diagnose a condition were discussed with the patient who freely signed the consent.      Description of Procedure:  The patient was taken to the endoscopy suite, identified as Dasia Prasad and the procedure verified as Endoscopic Ultrasound (EUS).  A Time Out was held and the above information confirmed. The patient was positioned in the left lateral position with an oral bite block and anesthesia was provided for sedation and comfort.      The endoscope was passed through the

## 2025-05-28 NOTE — H&P
General Surgery History and Physical  [facility]    Patient's Name/Date of Birth: Dasia Prasad / 1985    Date: May 28, 2025     Surgeon: Carl Du MD    PCP: Aquilino Harrell PA     Chief Complaint: pancreatic lesion    HPI:   Dasia Prasad is a 39 y.o. female who presents for evaluation of pancreatic lesion. She had abd US performed for abd pain which revealed 1.2cm pancreatic body lesion. She then had MRI abd that revealed 8mm subtle focus in the pancreatic body. She was referred for EUS for further evaluation. She has no personal or family history or pancreatic neoplasm.    Patient Active Problem List   Diagnosis    Obesity    Migraine headache    Fatigue    Right knee DJD    Pancreatic lesion       Past Medical History:   Diagnosis Date    Arthritis     Hypertension     Migraine        Past Surgical History:   Procedure Laterality Date    APPENDECTOMY       SECTION       SECTION N/A 2020    REPEAT  SECTION performed by Osvaldo Reed MD at [facility] L&D OR    HYSTERECTOMY (CERVIX STATUS UNKNOWN) N/A 6/3/2024    ROBOTIC ASSISTED TOTAL LAPAROSCOPIC HYSTERECTOMY performed by Horacio Lindsey MD at [facility] OR    KNEE ARTHROSCOPY Right 2024    RIGHT KNEE ARTHROSCOPY WITH CHONDROPLASTY AND DEBRIDEMENT-24 performed by Andrei Renteria DO at [facility] OR    TUBAL LIGATION         No Known Allergies    The patient has a family history that is negative for severe cardiovascular or respiratory issues, negative for reaction to anesthesia.     Time spent reviewing past medical, surgical, social and family history, vitals, nursing assessment and images. No changes from above documented history.    Social History     Socioeconomic History    Marital status:      Spouse name: Not on file    Number of children: Not on file    Years of education: Not on file    Highest education level: Not on file   Occupational History    Not on file

## 2025-05-29 LAB — NON-GYN CYTOLOGY REPORT: NORMAL

## 2025-06-04 LAB — SURGICAL PATHOLOGY REPORT: NORMAL

## 2025-06-11 ENCOUNTER — SCHEDULED TELEPHONE ENCOUNTER (OUTPATIENT)
Dept: SURGERY | Age: 40
End: 2025-06-11

## 2025-06-11 DIAGNOSIS — D3A.8 NEUROENDOCRINE TUMOR OF PANCREAS (HCC): Primary | ICD-10-CM

## 2025-06-11 PROCEDURE — 99024 POSTOP FOLLOW-UP VISIT: CPT | Performed by: SURGERY

## 2025-06-11 NOTE — PROGRESS NOTES
I attempted to call the patient however she had bad service and I was unable to speak with her  I instructed my office to discuss her pathology report with her and we will reattempt to reach her by phone  I will refer her to Dr. Galindo for findings of 1.2 cm neuroendocrine tumor in her pancreas  Will need to check chromogranin A  She will need surveillance for this at the very least however I will defer further management to [facility] surgery    Carl Du MD

## 2025-06-30 ENCOUNTER — TELEPHONE (OUTPATIENT)
Dept: SLEEP CENTER | Age: 40
End: 2025-06-30

## 2025-06-30 NOTE — TELEPHONE ENCOUNTER
Call placed to patient to attempt to schedule sleep study per provider order. Pt states she is refusing further sleep study testing.

## 2025-07-24 ENCOUNTER — OFFICE VISIT (OUTPATIENT)
Age: 40
End: 2025-07-24
Payer: COMMERCIAL

## 2025-07-24 VITALS
HEIGHT: 68 IN | DIASTOLIC BLOOD PRESSURE: 70 MMHG | HEART RATE: 92 BPM | BODY MASS INDEX: 38.04 KG/M2 | TEMPERATURE: 96.8 F | WEIGHT: 251 LBS | OXYGEN SATURATION: 98 % | SYSTOLIC BLOOD PRESSURE: 117 MMHG

## 2025-07-24 DIAGNOSIS — R10.11 RIGHT UPPER QUADRANT PAIN: ICD-10-CM

## 2025-07-24 DIAGNOSIS — D3A.8 NEUROENDOCRINE TUMOR OF PANCREAS (HCC): Primary | ICD-10-CM

## 2025-07-24 PROCEDURE — 99215 OFFICE O/P EST HI 40 MIN: CPT | Performed by: TRANSPLANT SURGERY

## 2025-07-24 PROCEDURE — G8428 CUR MEDS NOT DOCUMENT: HCPCS | Performed by: TRANSPLANT SURGERY

## 2025-07-24 PROCEDURE — G8417 CALC BMI ABV UP PARAM F/U: HCPCS | Performed by: TRANSPLANT SURGERY

## 2025-07-24 PROCEDURE — 4004F PT TOBACCO SCREEN RCVD TLK: CPT | Performed by: TRANSPLANT SURGERY

## 2025-07-24 PROCEDURE — 99214 OFFICE O/P EST MOD 30 MIN: CPT | Performed by: TRANSPLANT SURGERY

## 2025-07-24 NOTE — PROGRESS NOTES
Hepatobiliary and Pancreatic Surgery Attending History and Physical    Patient's Name/Date of Birth: Dasia Prasad /1985 (39 y.o.)    Date: 2025     CC: Pancreatic neuroendocrine tumor    HPI:  Patient is a very pleasant 39-year-old female states that she has been having sharp right-sided abdominal pain.  She had that can happen about half an hour after eating.  But she has not noticed if any foods make it worse.  She denies any nausea.  She also denies any diarrhea but does get some hot flashes.  She states that her symptoms have been ongoing since September.    Her mother had melanoma    Past Medical History:   Diagnosis Date    Arthritis     Hypertension     Migraine        Past Surgical History:   Procedure Laterality Date    APPENDECTOMY       SECTION       SECTION N/A 2020    REPEAT  SECTION performed by Osvaldo Reed MD at [facility] L&D OR    HYSTERECTOMY (CERVIX STATUS UNKNOWN) N/A 6/3/2024    ROBOTIC ASSISTED TOTAL LAPAROSCOPIC HYSTERECTOMY performed by Horacio Lindsey MD at [facility] OR    KNEE ARTHROSCOPY Right 2024    RIGHT KNEE ARTHROSCOPY WITH CHONDROPLASTY AND DEBRIDEMENT-24 performed by Andrei Renteria DO at [facility] OR    TUBAL LIGATION      UPPER GASTROINTESTINAL ENDOSCOPY N/A 2025    ESOPHAGOGASTRODUODENOSCOPY ENDOSCOPIC ULTRASOUND FINE NEEDLE ASPIRATION performed by Carl Du MD at [facility] ENDOSCOPY       Current Outpatient Medications   Medication Sig Dispense Refill    minocycline (MINOCIN;DYNACIN) 100 MG capsule Take 1 capsule by mouth 2 times daily      polyethylene glycol (MIRALAX) 17 GM/SCOOP POWD powder Take 17 g by mouth once      SENNA-TIME 8.6 MG tablet Take 2 tablets by mouth daily      lisinopril-hydroCHLOROthiazide (PRINZIDE;ZESTORETIC) 20-12.5 MG per tablet Take 1 tablet by mouth 2 times daily       No current facility-administered medications for this visit.       No Known Allergies    Family History

## 2025-07-24 NOTE — PATIENT INSTRUCTIONS
Staff members for Dr Galindo, Dr Lemon, and Avelino Jennings NP can be reached directly at [phone]

## 2025-07-25 ENCOUNTER — TELEPHONE (OUTPATIENT)
Age: 40
End: 2025-07-25

## 2025-07-25 NOTE — TELEPHONE ENCOUNTER
Per the [facility] look up tool cpt code 00150 does not require a prior auth. The patient is scheduled for her HIDA scan on 08/15/2025 seyh  Arrive 7:45 am  Scan 8:00 am  NPO 6 hours  NO meds or water the morning of  NO pain meds 4 hours prior  Scan can take up to 4 hours to complete    I called the patient and LVM for the patient to call and get the above information and to also schedule a follow up appt  Electronically signed by Laisha Zamora MA on 7/25/2025 at 11:02 AM

## 2025-07-26 ASSESSMENT — ENCOUNTER SYMPTOMS
VOMITING: 0
CONSTIPATION: 0
PHOTOPHOBIA: 0
BLOOD IN STOOL: 0
NAUSEA: 0
BACK PAIN: 0
EYE PAIN: 0
ABDOMINAL PAIN: 1
SHORTNESS OF BREATH: 0
DIARRHEA: 0
EYE DISCHARGE: 0

## 2025-07-29 NOTE — TELEPHONE ENCOUNTER
Dasia returned call to clinic and confirmed HIDA scan appt date, time, and location. Reviewed instructions and she expressed understand. A follow up appt was scheduled with Dr. Galindo.

## 2025-08-15 ENCOUNTER — HOSPITAL ENCOUNTER (OUTPATIENT)
Dept: NUCLEAR MEDICINE | Age: 40
Discharge: HOME | End: 2025-08-17
Attending: TRANSPLANT SURGERY
Payer: COMMERCIAL

## 2025-08-15 DIAGNOSIS — R10.11 RIGHT UPPER QUADRANT PAIN: ICD-10-CM

## 2025-08-15 PROCEDURE — 3430000000 HC RX DIAGNOSTIC RADIOPHARMACEUTICAL: Performed by: RADIOLOGY

## 2025-08-15 PROCEDURE — 78227 HEPATOBIL SYST IMAGE W/DRUG: CPT

## 2025-08-15 PROCEDURE — A9537 TC99M MEBROFENIN: HCPCS | Performed by: RADIOLOGY

## 2025-08-15 RX ADMIN — Medication 6.2 MILLICURIE: at 08:21

## 2025-09-05 ENCOUNTER — OFFICE VISIT (OUTPATIENT)
Age: 40
End: 2025-09-05
Payer: COMMERCIAL

## 2025-09-05 VITALS
SYSTOLIC BLOOD PRESSURE: 115 MMHG | HEIGHT: 68 IN | HEART RATE: 89 BPM | BODY MASS INDEX: 37.13 KG/M2 | OXYGEN SATURATION: 100 % | TEMPERATURE: 97.9 F | RESPIRATION RATE: 15 BRPM | WEIGHT: 245 LBS | DIASTOLIC BLOOD PRESSURE: 83 MMHG

## 2025-09-05 DIAGNOSIS — Z09 FOLLOW-UP EXAM: ICD-10-CM

## 2025-09-05 DIAGNOSIS — R10.13 EPIGASTRIC PAIN: Primary | ICD-10-CM

## 2025-09-05 PROCEDURE — 99212 OFFICE O/P EST SF 10 MIN: CPT | Performed by: TRANSPLANT SURGERY

## (undated) DEVICE — BLADELESS OBTURATOR: Brand: WECK VISTA

## (undated) DEVICE — 40586 ADVANCED TRENDELENBURG POSITIONING KIT: Brand: 40586 ADVANCED TRENDELENBURG POSITIONING KIT

## (undated) DEVICE — SUTURE PROL SZ 3-0 L18IN NONABSORBABLE BLU L19MM PS-2 3/8 8687H

## (undated) DEVICE — NDL CNTR 40CT FM MAG: Brand: MEDLINE INDUSTRIES, INC.

## (undated) DEVICE — PACK PROC ORTH LO EXT IX CUST

## (undated) DEVICE — SOLUTION IRRIG 1000ML 0.9% SOD CHL USP POUR PLAS BTL

## (undated) DEVICE — MASTISOL ADHESIVE LIQ 2/3ML

## (undated) DEVICE — GOWN,SIRUS,NONRNF,SETINSLV,XL,20/CS: Brand: MEDLINE

## (undated) DEVICE — APPLICATOR MEDICATED 26 CC SOLUTION HI LT ORNG CHLORAPREP

## (undated) DEVICE — 12 ML SYRINGE,LUER-LOCK TIP: Brand: MONOJECT

## (undated) DEVICE — TUBING, SUCTION, 1/4" X 10', STRAIGHT: Brand: MEDLINE

## (undated) DEVICE — TUBING PMP L16FT MAIN DISP FOR AR-6400 AR-6475 Â€“ ORDER MULTIPLES OF 10 EACH

## (undated) DEVICE — SHEET,DRAPE,40X58,STERILE: Brand: MEDLINE

## (undated) DEVICE — ABSORBENT, WATERPROOF, BACTERIA PROOF FILM DRESSING: Brand: OPSITE POST OP 10X12CM CTN 10

## (undated) DEVICE — BLADE CLIPPER GEN PURP NS

## (undated) DEVICE — Device: Brand: BALLOON3

## (undated) DEVICE — WIPES SKIN CLOTH READYPREP 9 X 10.5 IN 2% CHLORHEX GLUCONATE CHG PREOP

## (undated) DEVICE — PACK,AURORA,LAVH: Brand: MEDLINE

## (undated) DEVICE — BLOCK BITE 60FR CAREGUARD

## (undated) DEVICE — SYSTEM ES CUP DIA3.5CM PNEUMO OCCL BLLN DISP FOR CLIN POS

## (undated) DEVICE — Device: Brand: DEFENDO VALVE AND CONNECTOR KIT

## (undated) DEVICE — STRIP,CLOSURE,WOUND,MEDI-STRIP,1/2X4: Brand: MEDLINE

## (undated) DEVICE — STERILE VELCLOSE ELASTIC BANDAGE, 4IN X 10 YARDS: Brand: VELCLOSE

## (undated) DEVICE — SEAL

## (undated) DEVICE — GAUZE,SPONGE,4"X4",16PLY,XRAY,STRL,LF: Brand: MEDLINE

## (undated) DEVICE — FORCEPS BX L240CM JAW DIA2.8MM L CAP W/ NDL MIC MESH TOOTH

## (undated) DEVICE — SYRINGE MED 50ML LUERLOCK TIP

## (undated) DEVICE — SCISSORS SURG DIA8MM MPLR CRV ENDOWRIST

## (undated) DEVICE — 4-PORT MANIFOLD: Brand: NEPTUNE 2

## (undated) DEVICE — KIT,ANTI FOG,W/SPONGE & FLUID,SOFT PACK: Brand: MEDLINE

## (undated) DEVICE — GOWN SIRUS NONREIN XL W/TWL: Brand: MEDLINE INDUSTRIES, INC.

## (undated) DEVICE — DRESSING GZ XRFRM 4X4(25/BX 6BX/CS)

## (undated) DEVICE — KENDALL 450 SERIES MONITORING FOAM ELECTRODE - RECTANGULAR SHAPE ( 3/PK): Brand: KENDALL

## (undated) DEVICE — PADDING CAST W4INXL4YD NONSTERILE COT COHESIVE HND TEARABLE

## (undated) DEVICE — CLOTH PREP W7.5XL7.5IN 2% CHG SKIN ALC AND RNS FREE

## (undated) DEVICE — BANDAGE COMPR W6XL12FT SGL LAYERED NO CLSR EXSANGUATION

## (undated) DEVICE — MICRO TIP WIPE: Brand: DEVON

## (undated) DEVICE — BANDAGE COMPR W6INXL5YD SELF ADH COHESIVE CO FLX

## (undated) DEVICE — KIT BEDSIDE REVITAL OX 500ML

## (undated) DEVICE — CUFF TOURNIQUET 34 SNG BLADDER DUAL PORT

## (undated) DEVICE — SPONGE GZ 4IN 4IN 4 PLY N WVN AVANT

## (undated) DEVICE — SHEET,DRAPE,70X100,STERILE: Brand: MEDLINE

## (undated) DEVICE — COVER,LIGHT HANDLE,FLX,1/PK: Brand: MEDLINE INDUSTRIES, INC.

## (undated) DEVICE — TUBING, SUCTION, 3/16" X 10', STRAIGHT: Brand: MEDLINE

## (undated) DEVICE — NEEDLE HYPO 18GA L1.5IN PNK POLYPR HUB S STL REG BVL STR

## (undated) DEVICE — MARKER,SKIN,WI/RULER AND LABELS: Brand: MEDLINE

## (undated) DEVICE — CONTAINER SPEC COLL 960ML POLYPR TRIANG GRAD INTAKE/OUTPUT

## (undated) DEVICE — AIRSEAL 8 MM CANNULA CAP AND OBTURATOR WITH BLADELESS OPTICAL TIP COMPATIBLE WITH INTUITIVE DA VINCI XI AND DA VINCI X 8 MM INSTRUMENT CANNULA, STANDARD LENGTH: Brand: AIRSEAL

## (undated) DEVICE — GARMENT,MEDLINE,DVT,INT,CALF,MED, GEN2: Brand: MEDLINE

## (undated) DEVICE — BLADE,STAINLESS-STEEL,11,STRL,DISPOSABLE: Brand: MEDLINE

## (undated) DEVICE — MEGA SUTURECUT ND: Brand: ENDOWRIST

## (undated) DEVICE — PLUG,CATHETER,DRAINAGE PROTECTOR,TUBE: Brand: MEDLINE

## (undated) DEVICE — ELECTRODE PT RET AD L9FT HI MOIST COND ADH HYDRGEL CORDED

## (undated) DEVICE — INTENDED FOR TISSUE SEPARATION, AND OTHER PROCEDURES THAT REQUIRE A SHARP SURGICAL BLADE TO PUNCTURE OR CUT.: Brand: BARD-PARKER ® STAINLESS STEEL BLADES

## (undated) DEVICE — TOWEL,OR,DSP,ST,BLUE,STD,6/PK,12PK/CS: Brand: MEDLINE

## (undated) DEVICE — ARM DRAPE

## (undated) DEVICE — GAUZE,SPONGE,4"X4",16PLY,STRL,LF,10/TRAY: Brand: MEDLINE

## (undated) DEVICE — TIP COVER ACCESSORY

## (undated) DEVICE — BLADE SHV L13CM DIA4MM TAPR TIP SCIS LIKE CUT OVL OUTER

## (undated) DEVICE — SYRINGE IRRIG 60ML SFT PLIABLE BLB EZ TO GRP 1 HND USE W/

## (undated) DEVICE — COLUMN DRAPE

## (undated) DEVICE — CATHETER URETH 18FR BLLN 5CC SIL HYDRGEL 3 W F INDWL INF

## (undated) DEVICE — JELLY,LUBE,STERILE,FLIP TOP,TUBE,4-OZ: Brand: MEDLINE

## (undated) DEVICE — SUTURE V-LOC 180 SZ 0 L12IN ABSRB GRN L37MM GS-21 1/2 CIR VLOCL0316

## (undated) DEVICE — VAGINAL PREP TRAY: Brand: MEDLINE INDUSTRIES, INC.

## (undated) DEVICE — PAD,SANITARY,11 IN,MAXI,W/WINGS,N-STRL: Brand: MEDLINE

## (undated) DEVICE — INSUFFLATION NEEDLE TO ESTABLISH PNEUMOPERITONEUM.: Brand: INSUFFLATION NEEDLE

## (undated) DEVICE — GLOVE ORANGE PI 8   MSG9080

## (undated) DEVICE — YANKAUER,BULB TIP,W/O VENT,RIGID,STERILE: Brand: MEDLINE

## (undated) DEVICE — NEEDLE SPNL L3.5IN PNK HUB S STL REG WALL FIT STYL W/ QNCKE

## (undated) DEVICE — COUNTER NDL W1.5XL2.5IN 10 COUNT

## (undated) DEVICE — 1LYRTR 16FR10ML100%SIL UMS SNP: Brand: MEDLINE INDUSTRIES, INC.

## (undated) DEVICE — SYRINGE MED 10ML LUERLOCK TIP W/O SFTY DISP

## (undated) DEVICE — PEN: MARKING STD 100/CS: Brand: MEDICAL ACTION INDUSTRIES

## (undated) DEVICE — BAG SPECIMEN BIOHAZARD 6IN X 9IN

## (undated) DEVICE — TRI-LUMEN FILTERED TUBE SET WITH ACTIVATED CHARCOAL FILTER: Brand: AIRSEAL

## (undated) DEVICE — TIP IU L10CM DIA6.7MM GRN SIL FLX DISP RUMI II

## (undated) DEVICE — BASIC DOUBLE BASIN 2-LF: Brand: MEDLINE INDUSTRIES, INC.

## (undated) DEVICE — ENDOSCOPIC ULTRASOUND FINE NEEDLE BIOPSY (FNB) DEVICE: Brand: ACQUIRE